# Patient Record
Sex: FEMALE | Race: WHITE | NOT HISPANIC OR LATINO | Employment: OTHER | URBAN - NONMETROPOLITAN AREA
[De-identification: names, ages, dates, MRNs, and addresses within clinical notes are randomized per-mention and may not be internally consistent; named-entity substitution may affect disease eponyms.]

---

## 2020-11-28 ENCOUNTER — HOSPITAL ENCOUNTER (EMERGENCY)
Facility: HOSPITAL | Age: 50
Discharge: HOME | End: 2020-11-28
Attending: EMERGENCY MEDICINE
Payer: MEDICAID

## 2020-11-28 VITALS
OXYGEN SATURATION: 97 % | HEART RATE: 103 BPM | HEIGHT: 61 IN | WEIGHT: 110 LBS | SYSTOLIC BLOOD PRESSURE: 176 MMHG | RESPIRATION RATE: 20 BRPM | TEMPERATURE: 99 F | DIASTOLIC BLOOD PRESSURE: 98 MMHG | BODY MASS INDEX: 20.77 KG/M2

## 2020-11-28 DIAGNOSIS — B35.6 FUNGAL INFECTION OF THE GROIN: Primary | ICD-10-CM

## 2020-11-28 PROCEDURE — 63700000 PHARM REV CODE 250 ALT 637 W/O HCPCS: Performed by: NURSE PRACTITIONER

## 2020-11-28 PROCEDURE — 99284 EMERGENCY DEPT VISIT MOD MDM: CPT

## 2020-11-28 RX ORDER — FLUCONAZOLE 100 MG/1
200 TABLET ORAL
Status: COMPLETED | OUTPATIENT
Start: 2020-11-28 | End: 2020-11-28

## 2020-11-28 RX ORDER — FLUCONAZOLE 200 MG/1
200 TABLET ORAL DAILY
Qty: 7 TABLET | Refills: 0 | Status: SHIPPED | OUTPATIENT
Start: 2020-11-28 | End: 2020-12-05

## 2020-11-28 RX ORDER — CLOTRIMAZOLE AND BETAMETHASONE DIPROPIONATE 10; .64 MG/G; MG/G
CREAM TOPICAL 2 TIMES DAILY
Qty: 1 TUBE | Refills: 1 | Status: SHIPPED | OUTPATIENT
Start: 2020-11-28

## 2020-11-28 RX ADMIN — FLUCONAZOLE 200 MG: 100 TABLET ORAL at 10:11

## 2020-11-28 NOTE — ED PROVIDER NOTES
Encounter Date: 11/28/2020       History     Chief Complaint   Patient presents with    Rash     I have a rash between my legs, it doesn't itch but it is very painful, been there 2 days.     Patient is a 50-year-old female presents with complaints of rash to the groin.  Onset of symptoms was yesterday morning.  She states that the rash burns but does not itch.  She reports using a prescription ointment prescribed by her primary care physician which is irritating it more than relieving symptoms.  She also reports no relief with over-the-counter medications.        Review of patient's allergies indicates:   Allergen Reactions    Sulfa (sulfonamide antibiotics) Rash     No past medical history on file.  No past surgical history on file.  No family history on file.  Social History     Tobacco Use    Smoking status: Not on file   Substance Use Topics    Alcohol use: Not on file    Drug use: Not on file     Review of Systems   Constitutional: Negative for fever.   HENT: Negative for sore throat.    Respiratory: Negative for shortness of breath.    Cardiovascular: Negative for chest pain.   Gastrointestinal: Negative for nausea.   Genitourinary: Negative for dysuria.   Musculoskeletal: Negative for back pain.   Skin: Positive for rash (Groin).   Neurological: Negative for weakness.   Hematological: Does not bruise/bleed easily.       Physical Exam     Initial Vitals [11/28/20 1033]   BP Pulse Resp Temp SpO2   (!) 176/98 103 20 98.6 °F (37 °C) 97 %      MAP       --         Physical Exam    Nursing note and vitals reviewed.  Constitutional: Kisha Javier appears well-developed and well-nourished.   HENT:   Head: Normocephalic and atraumatic.   Eyes: Conjunctivae and EOM are normal. Pupils are equal, round, and reactive to light.   Neck: Normal range of motion. Neck supple.   Cardiovascular: Normal rate, regular rhythm, normal heart sounds and intact distal pulses.   Pulmonary/Chest: Breath sounds normal.   Abdominal:  Soft. Bowel sounds are normal.   Musculoskeletal: Normal range of motion.   Neurological: Kisha Javier is alert and oriented to person, place, and time. Kisha Javier has normal strength and normal reflexes.   Skin: Skin is warm and dry. Capillary refill takes less than 2 seconds. Rash (Consistent with a fungal rash noted to the groin bilaterally) noted.   Psychiatric: Kisha Javier has a normal mood and affect. Kisha Javier's behavior is normal. Judgment and thought content normal.         ED Course   Procedures  Labs Reviewed - No data to display       Imaging Results    None          Medical Decision Making:   ED Management:  Patient instructed take medication as prescribed and follow-up with primary care physician on Monday or Tuesday of next week.                             Clinical Impression:       ICD-10-CM ICD-9-CM   1. Fungal infection of the groin  B35.6 110.3                      Disposition:   Disposition: Discharged     ED Disposition Condition    Discharge Stable        ED Prescriptions     Medication Sig Dispense Start Date End Date Auth. Provider    clotrimazole-betamethasone 1-0.05% (LOTRISONE) cream Apply topically 2 (two) times daily. 1 Tube 11/28/2020  Manuel Banda NP    fluconazole (DIFLUCAN) 200 MG Tab Take 1 tablet (200 mg total) by mouth once daily. for 7 days 7 tablet 11/28/2020 12/5/2020 Manuel Banda NP        Follow-up Information    None                                      Manuel Banda NP  11/28/20 8765

## 2021-11-28 ENCOUNTER — HOSPITAL ENCOUNTER (EMERGENCY)
Facility: HOSPITAL | Age: 51
Discharge: HOME | End: 2021-11-28
Attending: EMERGENCY MEDICINE
Payer: MEDICAID

## 2021-11-28 VITALS
DIASTOLIC BLOOD PRESSURE: 72 MMHG | TEMPERATURE: 99 F | WEIGHT: 100 LBS | BODY MASS INDEX: 18.88 KG/M2 | OXYGEN SATURATION: 98 % | SYSTOLIC BLOOD PRESSURE: 111 MMHG | HEIGHT: 61 IN | RESPIRATION RATE: 16 BRPM | HEART RATE: 84 BPM

## 2021-11-28 DIAGNOSIS — K04.7 DENTAL ABSCESS: Primary | ICD-10-CM

## 2021-11-28 PROCEDURE — 25000003 PHARM REV CODE 250: Performed by: NURSE PRACTITIONER

## 2021-11-28 PROCEDURE — 99284 EMERGENCY DEPT VISIT MOD MDM: CPT | Mod: 25

## 2021-11-28 RX ORDER — PENICILLIN V POTASSIUM 250 MG/1
500 TABLET, FILM COATED ORAL
Status: COMPLETED | OUTPATIENT
Start: 2021-11-28 | End: 2021-11-28

## 2021-11-28 RX ORDER — PENICILLIN V POTASSIUM 500 MG/1
500 TABLET, FILM COATED ORAL EVERY 6 HOURS
Qty: 28 TABLET | Refills: 0 | Status: SHIPPED | OUTPATIENT
Start: 2021-11-28

## 2021-11-28 RX ORDER — IBUPROFEN 800 MG/1
800 TABLET ORAL
Status: COMPLETED | OUTPATIENT
Start: 2021-11-28 | End: 2021-11-28

## 2021-11-28 RX ORDER — IBUPROFEN 800 MG/1
800 TABLET ORAL EVERY 6 HOURS PRN
Qty: 20 TABLET | Refills: 0 | Status: ON HOLD | OUTPATIENT
Start: 2021-11-28 | End: 2021-12-28 | Stop reason: HOSPADM

## 2021-11-28 RX ADMIN — IBUPROFEN 800 MG: 800 TABLET ORAL at 12:11

## 2021-11-28 RX ADMIN — PENICILLIN V POTASIUM 500 MG: 250 TABLET ORAL at 12:11

## 2021-12-24 ENCOUNTER — HOSPITAL ENCOUNTER (INPATIENT)
Facility: HOSPITAL | Age: 51
LOS: 4 days | Discharge: HOME | DRG: 085 | End: 2021-12-28
Attending: EMERGENCY MEDICINE | Admitting: INTERNAL MEDICINE
Payer: MEDICAID

## 2021-12-24 ENCOUNTER — HOSPITAL ENCOUNTER (EMERGENCY)
Facility: HOSPITAL | Age: 51
Discharge: ANOTHER HOSPITAL | End: 2021-12-24
Attending: STUDENT IN AN ORGANIZED HEALTH CARE EDUCATION/TRAINING PROGRAM
Payer: MEDICAID

## 2021-12-24 VITALS
TEMPERATURE: 98 F | HEART RATE: 84 BPM | RESPIRATION RATE: 18 BRPM | OXYGEN SATURATION: 97 % | DIASTOLIC BLOOD PRESSURE: 60 MMHG | SYSTOLIC BLOOD PRESSURE: 113 MMHG
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
## 2021-12-24 DIAGNOSIS — S06.5XAA SDH (SUBDURAL HEMATOMA): ICD-10-CM

## 2021-12-24 DIAGNOSIS — S06.5XAA SUBDURAL HEMATOMA: Primary | ICD-10-CM

## 2021-12-24 DIAGNOSIS — G93.40 ACUTE ENCEPHALOPATHY: ICD-10-CM

## 2021-12-24 DIAGNOSIS — S06.5XAA SDH (SUBDURAL HEMATOMA): Primary | ICD-10-CM

## 2021-12-24 DIAGNOSIS — R41.0 CONFUSED: ICD-10-CM

## 2021-12-24 DIAGNOSIS — U07.1 COVID: ICD-10-CM

## 2021-12-24 PROBLEM — R53.81 DEBILITY: Status: ACTIVE | Noted: 2021-12-24

## 2021-12-24 PROBLEM — F19.91 HISTORY OF DRUG USE: Status: ACTIVE | Noted: 2021-12-24

## 2021-12-24 PROBLEM — G93.5 BRAIN COMPRESSION: Status: ACTIVE | Noted: 2021-12-24

## 2021-12-24 LAB
ALBUMIN SERPL BCP-MCNC: 3.1 G/DL (ref 3.5–5.2)
ALP SERPL-CCNC: 109 U/L (ref 55–135)
ALT SERPL W/O P-5'-P-CCNC: 35 U/L (ref 10–44)
AMPHET+METHAMPHET UR QL: NEGATIVE
AMPHET+METHAMPHET UR QL: NEGATIVE
ANION GAP SERPL CALC-SCNC: 11 MMOL/L (ref 8–16)
APTT BLDCRRT: 32.5 SEC (ref 21–32)
AST SERPL-CCNC: 42 U/L (ref 10–40)
BARBITURATES UR QL SCN>200 NG/ML: NEGATIVE
BARBITURATES UR QL SCN>200 NG/ML: NEGATIVE
BASOPHILS # BLD AUTO: 0.02 K/UL (ref 0–0.2)
BASOPHILS NFR BLD: 0.2 % (ref 0–1.9)
BENZODIAZ UR QL SCN>200 NG/ML: NEGATIVE
BENZODIAZ UR QL SCN>200 NG/ML: NEGATIVE
BILIRUB SERPL-MCNC: 0.2 MG/DL (ref 0.1–1)
BUN SERPL-MCNC: 11 MG/DL (ref 6–20)
BUN SERPL-MCNC: 11 MG/DL (ref 6–30)
BZE UR QL SCN: NEGATIVE
BZE UR QL SCN: NEGATIVE
CALCIUM SERPL-MCNC: 8.6 MG/DL (ref 8.7–10.5)
CANNABINOIDS UR QL SCN: ABNORMAL
CANNABINOIDS UR QL SCN: ABNORMAL
CHLORIDE SERPL-SCNC: 105 MMOL/L (ref 95–110)
CHLORIDE SERPL-SCNC: 109 MMOL/L (ref 95–110)
CHOLEST SERPL-MCNC: 170 MG/DL (ref 120–199)
CHOLEST/HDLC SERPL: 2.5 {RATIO} (ref 2–5)
CK SERPL-CCNC: 200 U/L (ref 20–180)
CO2 SERPL-SCNC: 29 MMOL/L (ref 23–29)
CREAT SERPL-MCNC: 0.5 MG/DL (ref 0.5–1.4)
CREAT SERPL-MCNC: 0.8 MG/DL (ref 0.5–1.4)
CREAT UR-MCNC: 138 MG/DL (ref 15–325)
CREAT UR-MCNC: 138 MG/DL (ref 15–325)
CTP QC/QA: YES
DIFFERENTIAL METHOD: ABNORMAL
EOSINOPHIL # BLD AUTO: 0 K/UL (ref 0–0.5)
EOSINOPHIL NFR BLD: 0 % (ref 0–8)
ERYTHROCYTE [DISTWIDTH] IN BLOOD BY AUTOMATED COUNT: 14.1 % (ref 11.5–14.5)
EST. GFR  (AFRICAN AMERICAN): >60 ML/MIN/1.73 M^2
EST. GFR  (NON AFRICAN AMERICAN): >60 ML/MIN/1.73 M^2
ETHANOL SERPL-MCNC: <3 MG/DL
ETHANOL UR-MCNC: <10 MG/DL
GLUCOSE SERPL-MCNC: 108 MG/DL (ref 70–110)
GLUCOSE SERPL-MCNC: 124 MG/DL (ref 70–110)
HCT VFR BLD AUTO: 41.8 % (ref 37–48.5)
HCT VFR BLD CALC: 44 %PCV (ref 36–54)
HDLC SERPL-MCNC: 68 MG/DL (ref 40–75)
HDLC SERPL: 40 % (ref 20–50)
HGB BLD-MCNC: 13.9 G/DL (ref 12–16)
IMM GRANULOCYTES # BLD AUTO: 0.06 K/UL (ref 0–0.04)
IMM GRANULOCYTES NFR BLD AUTO: 0.5 % (ref 0–0.5)
INR PPP: 0.9 (ref 0.8–1.2)
LDLC SERPL CALC-MCNC: 79.2 MG/DL (ref 63–159)
LYMPHOCYTES # BLD AUTO: 1.7 K/UL (ref 1–4.8)
LYMPHOCYTES NFR BLD: 14.2 % (ref 18–48)
MAGNESIUM SERPL-MCNC: 2 MG/DL (ref 1.6–2.6)
MCH RBC QN AUTO: 30.3 PG (ref 27–31)
MCHC RBC AUTO-ENTMCNC: 33.3 G/DL (ref 32–36)
MCV RBC AUTO: 91 FL (ref 82–98)
METHADONE UR QL SCN>300 NG/ML: NEGATIVE
METHADONE UR QL SCN>300 NG/ML: NEGATIVE
MONOCYTES # BLD AUTO: 0.5 K/UL (ref 0.3–1)
MONOCYTES NFR BLD: 4.6 % (ref 4–15)
NEUTROPHILS # BLD AUTO: 9.4 K/UL (ref 1.8–7.7)
NEUTROPHILS NFR BLD: 80.5 % (ref 38–73)
NONHDLC SERPL-MCNC: 102 MG/DL
NRBC BLD-RTO: 0 /100 WBC
OPIATES UR QL SCN: NEGATIVE
OPIATES UR QL SCN: NEGATIVE
PCP UR QL SCN>25 NG/ML: NEGATIVE
PCP UR QL SCN>25 NG/ML: NEGATIVE
PHOSPHATE SERPL-MCNC: 2.8 MG/DL (ref 2.7–4.5)
PLATELET # BLD AUTO: 265 K/UL (ref 150–450)
PMV BLD AUTO: 8.5 FL (ref 9.2–12.9)
POC IONIZED CALCIUM: 1.01 MMOL/L (ref 1.06–1.42)
POC TCO2 (MEASURED): 23 MMOL/L (ref 23–29)
POTASSIUM BLD-SCNC: 3.4 MMOL/L (ref 3.5–5.1)
POTASSIUM SERPL-SCNC: 3.9 MMOL/L (ref 3.5–5.1)
PROT SERPL-MCNC: 7.3 G/DL (ref 6–8.4)
PROTHROMBIN TIME: 9.8 SEC (ref 9–12.5)
RBC # BLD AUTO: 4.58 M/UL (ref 4–5.4)
SAMPLE: ABNORMAL
SARS-COV-2 RDRP RESP QL NAA+PROBE: POSITIVE
SODIUM BLD-SCNC: 141 MMOL/L (ref 136–145)
SODIUM SERPL-SCNC: 145 MMOL/L (ref 136–145)
TOXICOLOGY INFORMATION: ABNORMAL
TOXICOLOGY INFORMATION: ABNORMAL
TRIGL SERPL-MCNC: 114 MG/DL (ref 30–150)
TROPONIN I SERPL DL<=0.01 NG/ML-MCNC: 0.01 NG/ML (ref 0–0.03)
TROPONIN I SERPL DL<=0.01 NG/ML-MCNC: 6.1 PG/ML (ref 0–60)
TSH SERPL DL<=0.005 MIU/L-ACNC: 1.49 UIU/ML (ref 0.4–4)
WBC # BLD AUTO: 11.7 K/UL (ref 3.9–12.7)

## 2021-12-24 PROCEDURE — 20000000 HC ICU ROOM

## 2021-12-24 PROCEDURE — 83735 ASSAY OF MAGNESIUM: CPT | Performed by: STUDENT IN AN ORGANIZED HEALTH CARE EDUCATION/TRAINING PROGRAM

## 2021-12-24 PROCEDURE — 27000207 HC ISOLATION

## 2021-12-24 PROCEDURE — 99291 CRITICAL CARE FIRST HOUR: CPT | Mod: 25

## 2021-12-24 PROCEDURE — 84100 ASSAY OF PHOSPHORUS: CPT | Performed by: STUDENT IN AN ORGANIZED HEALTH CARE EDUCATION/TRAINING PROGRAM

## 2021-12-24 PROCEDURE — 80047 BASIC METABLC PNL IONIZED CA: CPT

## 2021-12-24 PROCEDURE — 99285 EMERGENCY DEPT VISIT HI MDM: CPT | Mod: 25

## 2021-12-24 PROCEDURE — 25000003 PHARM REV CODE 250: Performed by: NURSE PRACTITIONER

## 2021-12-24 PROCEDURE — 99900035 HC TECH TIME PER 15 MIN (STAT)

## 2021-12-24 PROCEDURE — 84484 ASSAY OF TROPONIN QUANT: CPT | Mod: 91 | Performed by: INTERNAL MEDICINE

## 2021-12-24 PROCEDURE — 36415 COLL VENOUS BLD VENIPUNCTURE: CPT | Performed by: STUDENT IN AN ORGANIZED HEALTH CARE EDUCATION/TRAINING PROGRAM

## 2021-12-24 PROCEDURE — 63600175 PHARM REV CODE 636 W HCPCS: Performed by: STUDENT IN AN ORGANIZED HEALTH CARE EDUCATION/TRAINING PROGRAM

## 2021-12-24 PROCEDURE — 80061 LIPID PANEL: CPT | Performed by: INTERNAL MEDICINE

## 2021-12-24 PROCEDURE — 82077 ASSAY SPEC XCP UR&BREATH IA: CPT | Performed by: STUDENT IN AN ORGANIZED HEALTH CARE EDUCATION/TRAINING PROGRAM

## 2021-12-24 PROCEDURE — 94761 N-INVAS EAR/PLS OXIMETRY MLT: CPT

## 2021-12-24 PROCEDURE — 80053 COMPREHEN METABOLIC PANEL: CPT | Performed by: STUDENT IN AN ORGANIZED HEALTH CARE EDUCATION/TRAINING PROGRAM

## 2021-12-24 PROCEDURE — 85025 COMPLETE CBC W/AUTO DIFF WBC: CPT | Performed by: STUDENT IN AN ORGANIZED HEALTH CARE EDUCATION/TRAINING PROGRAM

## 2021-12-24 PROCEDURE — 96374 THER/PROPH/DIAG INJ IV PUSH: CPT

## 2021-12-24 PROCEDURE — 85730 THROMBOPLASTIN TIME PARTIAL: CPT | Performed by: INTERNAL MEDICINE

## 2021-12-24 PROCEDURE — 27000221 HC OXYGEN, UP TO 24 HOURS

## 2021-12-24 PROCEDURE — 84443 ASSAY THYROID STIM HORMONE: CPT | Performed by: INTERNAL MEDICINE

## 2021-12-24 PROCEDURE — 99284 EMERGENCY DEPT VISIT MOD MDM: CPT | Mod: ,,, | Performed by: EMERGENCY MEDICINE

## 2021-12-24 PROCEDURE — 99284 PR EMERGENCY DEPT VISIT,LEVEL IV: ICD-10-PCS | Mod: ,,, | Performed by: EMERGENCY MEDICINE

## 2021-12-24 PROCEDURE — 84484 ASSAY OF TROPONIN QUANT: CPT | Performed by: STUDENT IN AN ORGANIZED HEALTH CARE EDUCATION/TRAINING PROGRAM

## 2021-12-24 PROCEDURE — 99284 EMERGENCY DEPT VISIT MOD MDM: CPT | Mod: ,,, | Performed by: PHYSICIAN ASSISTANT

## 2021-12-24 PROCEDURE — 85610 PROTHROMBIN TIME: CPT | Performed by: INTERNAL MEDICINE

## 2021-12-24 PROCEDURE — 80307 DRUG TEST PRSMV CHEM ANLYZR: CPT | Performed by: NURSE PRACTITIONER

## 2021-12-24 PROCEDURE — 99284 PR EMERGENCY DEPT VISIT,LEVEL IV: ICD-10-PCS | Mod: ,,, | Performed by: PHYSICIAN ASSISTANT

## 2021-12-24 PROCEDURE — 25000003 PHARM REV CODE 250: Performed by: STUDENT IN AN ORGANIZED HEALTH CARE EDUCATION/TRAINING PROGRAM

## 2021-12-24 PROCEDURE — U0002 COVID-19 LAB TEST NON-CDC: HCPCS | Performed by: STUDENT IN AN ORGANIZED HEALTH CARE EDUCATION/TRAINING PROGRAM

## 2021-12-24 PROCEDURE — 82550 ASSAY OF CK (CPK): CPT | Performed by: STUDENT IN AN ORGANIZED HEALTH CARE EDUCATION/TRAINING PROGRAM

## 2021-12-24 PROCEDURE — 63600175 PHARM REV CODE 636 W HCPCS: Performed by: PHYSICIAN ASSISTANT

## 2021-12-24 RX ORDER — LEVETIRACETAM 500 MG/5ML
500 INJECTION, SOLUTION, CONCENTRATE INTRAVENOUS EVERY 12 HOURS
Status: DISCONTINUED | OUTPATIENT
Start: 2021-12-24 | End: 2021-12-24

## 2021-12-24 RX ORDER — DULOXETIN HYDROCHLORIDE 60 MG/1
60 CAPSULE, DELAYED RELEASE ORAL DAILY
COMMUNITY
Start: 2021-12-20

## 2021-12-24 RX ORDER — OXCARBAZEPINE 600 MG/1
TABLET, FILM COATED ORAL
Status: ON HOLD | COMMUNITY
End: 2021-12-28 | Stop reason: HOSPADM

## 2021-12-24 RX ORDER — LEVETIRACETAM 500 MG/5ML
INJECTION, SOLUTION, CONCENTRATE INTRAVENOUS
Status: DISPENSED
Start: 2021-12-24 | End: 2021-12-25

## 2021-12-24 RX ORDER — MONTELUKAST SODIUM 10 MG/1
TABLET ORAL
Status: ON HOLD | COMMUNITY
End: 2021-12-28 | Stop reason: HOSPADM

## 2021-12-24 RX ORDER — CYCLOBENZAPRINE HCL 5 MG
TABLET ORAL
Status: ON HOLD | COMMUNITY
End: 2021-12-28 | Stop reason: HOSPADM

## 2021-12-24 RX ORDER — GABAPENTIN 100 MG/1
CAPSULE ORAL
COMMUNITY
Start: 2021-12-20

## 2021-12-24 RX ORDER — MIRTAZAPINE 45 MG/1
TABLET, FILM COATED ORAL
Status: ON HOLD | COMMUNITY
End: 2021-12-28 | Stop reason: HOSPADM

## 2021-12-24 RX ORDER — MONTELUKAST SODIUM 10 MG/1
10 TABLET ORAL NIGHTLY
Status: DISCONTINUED | OUTPATIENT
Start: 2021-12-24 | End: 2021-12-28 | Stop reason: HOSPADM

## 2021-12-24 RX ORDER — DULOXETIN HYDROCHLORIDE 60 MG/1
CAPSULE, DELAYED RELEASE ORAL
Status: ON HOLD | COMMUNITY
End: 2021-12-28 | Stop reason: HOSPADM

## 2021-12-24 RX ORDER — PHENOL/SODIUM PHENOLATE
AEROSOL, SPRAY (ML) MUCOUS MEMBRANE
COMMUNITY

## 2021-12-24 RX ORDER — CYCLOBENZAPRINE HCL 5 MG
5 TABLET ORAL 3 TIMES DAILY PRN
COMMUNITY
Start: 2021-11-03 | End: 2023-01-07

## 2021-12-24 RX ORDER — LEVETIRACETAM 500 MG/1
500 TABLET ORAL 2 TIMES DAILY
Status: DISCONTINUED | OUTPATIENT
Start: 2021-12-24 | End: 2021-12-24

## 2021-12-24 RX ORDER — BUSPIRONE HYDROCHLORIDE 15 MG/1
15 TABLET ORAL 2 TIMES DAILY
COMMUNITY
Start: 2021-12-20

## 2021-12-24 RX ORDER — AMOXICILLIN 250 MG
1 CAPSULE ORAL DAILY
Status: DISCONTINUED | OUTPATIENT
Start: 2021-12-25 | End: 2021-12-28 | Stop reason: HOSPADM

## 2021-12-24 RX ORDER — ONDANSETRON 2 MG/ML
4 INJECTION INTRAMUSCULAR; INTRAVENOUS EVERY 6 HOURS PRN
Status: DISCONTINUED | OUTPATIENT
Start: 2021-12-24 | End: 2021-12-28 | Stop reason: HOSPADM

## 2021-12-24 RX ORDER — HYDRALAZINE HYDROCHLORIDE 20 MG/ML
10 INJECTION INTRAMUSCULAR; INTRAVENOUS EVERY 4 HOURS PRN
Status: DISCONTINUED | OUTPATIENT
Start: 2021-12-24 | End: 2021-12-28 | Stop reason: HOSPADM

## 2021-12-24 RX ORDER — ACETAMINOPHEN 325 MG/1
650 TABLET ORAL EVERY 6 HOURS PRN
Status: DISCONTINUED | OUTPATIENT
Start: 2021-12-24 | End: 2021-12-28 | Stop reason: HOSPADM

## 2021-12-24 RX ORDER — ACETAMINOPHEN 325 MG/1
650 TABLET ORAL
Status: COMPLETED | OUTPATIENT
Start: 2021-12-24 | End: 2021-12-24

## 2021-12-24 RX ORDER — QUETIAPINE FUMARATE 200 MG/1
TABLET, FILM COATED ORAL
Status: ON HOLD | COMMUNITY
End: 2021-12-28 | Stop reason: HOSPADM

## 2021-12-24 RX ORDER — GABAPENTIN 100 MG/1
CAPSULE ORAL
Status: ON HOLD | COMMUNITY
End: 2021-12-28 | Stop reason: HOSPADM

## 2021-12-24 RX ORDER — OXCARBAZEPINE 600 MG/1
600 TABLET, FILM COATED ORAL NIGHTLY
COMMUNITY
Start: 2021-12-20

## 2021-12-24 RX ORDER — ONDANSETRON 2 MG/ML
4 INJECTION INTRAMUSCULAR; INTRAVENOUS
Status: COMPLETED | OUTPATIENT
Start: 2021-12-24 | End: 2021-12-24

## 2021-12-24 RX ORDER — QUETIAPINE FUMARATE 200 MG/1
200 TABLET, FILM COATED ORAL NIGHTLY
COMMUNITY
Start: 2021-12-20

## 2021-12-24 RX ORDER — ESTROGENS, CONJUGATED 1.25 MG
1.25 TABLET ORAL DAILY
COMMUNITY
Start: 2021-12-20

## 2021-12-24 RX ORDER — MORPHINE SULFATE 2 MG/ML
2 INJECTION, SOLUTION INTRAMUSCULAR; INTRAVENOUS
Status: DISCONTINUED | OUTPATIENT
Start: 2021-12-24 | End: 2021-12-24 | Stop reason: HOSPADM

## 2021-12-24 RX ORDER — LEVETIRACETAM 500 MG/5ML
500 INJECTION, SOLUTION, CONCENTRATE INTRAVENOUS EVERY 12 HOURS
Status: DISCONTINUED | OUTPATIENT
Start: 2021-12-24 | End: 2021-12-25

## 2021-12-24 RX ORDER — MIRTAZAPINE 45 MG/1
45 TABLET, FILM COATED ORAL NIGHTLY
COMMUNITY
Start: 2021-12-20

## 2021-12-24 RX ORDER — MONTELUKAST SODIUM 10 MG/1
10 TABLET ORAL EVERY MORNING
COMMUNITY
Start: 2021-12-20

## 2021-12-24 RX ADMIN — ONDANSETRON HYDROCHLORIDE 4 MG: 2 SOLUTION INTRAMUSCULAR; INTRAVENOUS at 01:12

## 2021-12-24 RX ADMIN — LEVETIRACETAM 500 MG: 500 INJECTION, SOLUTION INTRAVENOUS at 03:12

## 2021-12-24 RX ADMIN — ACETAMINOPHEN 650 MG: 325 TABLET ORAL at 11:12

## 2021-12-24 RX ADMIN — MONTELUKAST 10 MG: 10 TABLET, FILM COATED ORAL at 09:12

## 2021-12-24 RX ADMIN — ACETAMINOPHEN 650 MG: 325 TABLET ORAL at 09:12

## 2021-12-24 NOTE — Clinical Note
Shay Ladd accompanied their mother to the emergency department on 12/24/2021. They may return to work on 12/27/2021.      If you have any questions or concerns, please don't hesitate to call.      Eileen Sams RN

## 2021-12-24 NOTE — ED PROVIDER NOTES
"Encounter Date: 12/24/2021       History     Chief Complaint   Patient presents with    Weakness     Patient fell lastnight per son. Patient has been "twitching" since lastnight. Ems states patient would not answer questions. Patient states she needs a blanket and water and that she was allergic to meication when I stated she was not. Patient states, "im coming too now and I am allergic to penicillin"     51-year-old female with history of methamphetamine abuse not on any blood thinner brought in by EMS for confusion.  According to family, patient was found on the ground with bruising to the left side of the face.  Family said that patient has been acting abnormal for the past week and is concerned that she may be using drugs.  Patient says that she was walking and fell but history limited due to medical condition        Review of patient's allergies indicates:   Allergen Reactions    Sulfa (sulfonamide antibiotics) Rash    Pcn [penicillins]      Past Medical History:   Diagnosis Date    Bipolar depression      History reviewed. No pertinent surgical history.  History reviewed. No pertinent family history.     Review of Systems   Unable to perform ROS: Mental status change       Physical Exam     Initial Vitals [12/24/21 1104]   BP Pulse Resp Temp SpO2   (!) 160/70 84 18 98 °F (36.7 °C) 100 %      MAP       --         Physical Exam    Nursing note and vitals reviewed.  Constitutional: Vital signs are normal. She appears well-developed and well-nourished.   HENT:   Head: Normocephalic and atraumatic.   Eyes: Conjunctivae, EOM and lids are normal. Pupils are equal, round, and reactive to light.   Neck: Trachea normal. Neck supple.   Cardiovascular: Normal rate, regular rhythm and normal pulses.   Pulmonary/Chest: Breath sounds normal. She has no wheezes. She has no rhonchi.   Abdominal: Abdomen is soft. Bowel sounds are normal.   Musculoskeletal:         General: Normal range of motion.      Cervical back: Neck " supple.     Neurological: She is alert and oriented to person, place, and time. She has normal strength.   GCS 14   Skin: Capillary refill takes less than 2 seconds.   Swelling to the left orbit and contusion to elbows any   Psychiatric: She has a normal mood and affect. Her speech is normal.         ED Course   Critical Care    Date/Time: 12/24/2021 2:00 PM  Performed by: Brayan Villa MD  Authorized by: Brayan Villa MD   Direct patient critical care time: 10 minutes  Additional history critical care time: 5 minutes  Ordering / reviewing critical care time: 5 minutes  Documentation critical care time: 5 minutes  Consulting other physicians critical care time: 5 minutes  Consult with family critical care time: 5 minutes  Total critical care time (exclusive of procedural time) : 35 minutes  Critical care time was exclusive of separately billable procedures and treating other patients.  Critical care was necessary to treat or prevent imminent or life-threatening deterioration of the following conditions: CNS failure or compromise and trauma.  Critical care was time spent personally by me on the following activities: evaluation of patient's response to treatment, examination of patient, obtaining history from patient or surrogate, ordering and performing treatments and interventions, ordering and review of radiographic studies, ordering and review of laboratory studies, pulse oximetry and re-evaluation of patient's condition.        Labs Reviewed   CBC W/ AUTO DIFFERENTIAL - Abnormal; Notable for the following components:       Result Value    MPV 8.5 (*)     Gran # (ANC) 9.4 (*)     Immature Grans (Abs) 0.06 (*)     Gran % 80.5 (*)     Lymph % 14.2 (*)     All other components within normal limits   COMPREHENSIVE METABOLIC PANEL - Abnormal; Notable for the following components:    Glucose 124 (*)     Calcium 8.6 (*)     Albumin 3.1 (*)     AST 42 (*)     All other components within normal limits   CK - Abnormal;  Notable for the following components:     (*)     All other components within normal limits   SARS-COV-2 RDRP GENE - Abnormal; Notable for the following components:    POC Rapid COVID Positive (*)     All other components within normal limits    Narrative:     This test utilizes isothermal nucleic acid amplification   technology to detect the SARS-CoV-2 RdRp nucleic acid segment.   The analytical sensitivity (limit of detection) is 125 genome   equivalents/mL.   A POSITIVE result implies infection with the SARS-CoV-2 virus;   the patient is presumed to be contagious.     A NEGATIVE result means that SARS-CoV-2 nucleic acids are not   present above the limit of detection. A NEGATIVE result should be   treated as presumptive. It does not rule out the possibility of   COVID-19 and should not be the sole basis for treatment decisions.   If COVID-19 is strongly suspected based on clinical and exposure   history, re-testing using an alternate molecular assay should be   considered.   This test is only for use under the Food and Drug   Administration s Emergency Use Authorization (EUA).   Commercial kits are provided by [facility].   Performance characteristics of the EUA have been independently   verified by [facility] Department of   Pathology and Laboratory Medicine.   _________________________________________________________________   The authorized Fact Sheet for Healthcare Providers and the authorized Fact   Sheet for Patients of the ID NOW COVID-19 are available on the FDA   website:     https://www.fda.gov/media/283403/download  https://www.fda.gov/media/470567/download         TROPONIN I HIGH SENSITIVITY   ALCOHOL,MEDICAL (ETHANOL)   MAGNESIUM   PHOSPHORUS     EKG Readings: (Independently Interpreted)   Initial Reading: No STEMI. Rhythm: Normal Sinus Rhythm. Conduction: Normal. Axis: Normal.       Imaging Results          CT Cervical Spine Without Contrast (Final result)  Result time  12/24/21 12:07:47    Final result by Odalys Yap MD (12/24/21 12:07:47)                 Impression:      No evidence of a fracture or dislocation    Multilevel spondylosis, greatest C5-6 and C6-7      Electronically signed by: Odalys Yap MD  Date:    12/24/2021  Time:    12:07             Narrative:    EXAMINATION:  CT CERVICAL SPINE WITHOUT CONTRAST    CLINICAL HISTORY:  Cervical radiculopathy, no red flags;    CT/Cardiac Nuclear exams in prior 12 months: 0    TECHNIQUE:  CT cervical spine without IV contrast.  Iterative reconstruction was utilized.  Sagittal and coronal reformats prepared.    FINDINGS:  There is no evidence of a cervical spine fracture or dislocation.    There is multilevel facet osteoarthritis, along with multilevel degenerative disc changes, greatest C5-6 and C6-7 with posterior disc osteophyte complexes.                               CT Maxillofacial Without Contrast (Final result)  Result time 12/24/21 11:59:58    Final result by Odalys Yap MD (12/24/21 11:59:58)                 Impression:      No evidence of a facial bone fracture    Prominent preseptal left periorbital soft tissue swelling      Electronically signed by: Odalys Yap MD  Date:    12/24/2021  Time:    11:59             Narrative:    EXAMINATION:  CT MAXILLOFACIAL WITHOUT CONTRAST    CLINICAL HISTORY:  Abnormal xray - maxface/sinus;    TECHNIQUE:  Axial images through the facial bones without IV contrast.  Iterative reconstruction utilized.  Sagittal and coronal reformats prepared.    FINDINGS:  Prominent left preseptal periorbital soft tissue swelling.  No evidence of a retrobulbar hematoma.  No evidence of a facial bone fracture.  Temporal mandibular joints are intact.  No sinus air-fluid levels.  Extensive dental caries.  Mastoids and middle ear cavities are clear.                               CT Head Without Contrast (Final result)  Result time 12/24/21 11:57:06    Final result by Odalys Yap,  MD (12/24/21 11:57:06)                 Impression:      1. Acute right subdural hematoma measuring less than 1 cm in thickness overlying portions of right frontal parietal and temporal lobes  2. Flattened right lateral ventricle with 5 mm left midline shift  3. Large left frontal-parietal scalp hematoma      Electronically signed by: Odalys Yap MD  Date:    12/24/2021  Time:    11:57             Narrative:    EXAMINATION:  CT HEAD WITHOUT CONTRAST    CLINICAL HISTORY:  Memory loss;    CT/Cardiac Nuclear exams in prior 12 months: 0    TECHNIQUE:  Axial head CT performed without IV contrast.  Iterative reconstruction utilized.    FINDINGS:  Acute subdural hemorrhage overlies portions of right frontal, parietal and temporal lobes measuring 7 mm in greatest thickness.  5 mm left midline shift.  Flattening of right lateral ventricle.  No acute infarct evident.  No atrophy.  Large left frontal parietal    Scalp hematoma.  No evidence of an underlying fracture.                               X-Ray Chest 1 View (Final result)  Result time 12/24/21 12:09:48    Final result by Odalys Yap MD (12/24/21 12:09:48)                 Impression:      No acute findings      Electronically signed by: Odalys Yap MD  Date:    12/24/2021  Time:    12:09             Narrative:    EXAMINATION:  XR CHEST 1 VIEW    CLINICAL HISTORY:  Disorientation, unspecified    FINDINGS:  No acute infiltrates. No pneumothorax or pleural effusion detected.  No vascular congestion. Normal size cardiac silhouette. Chronic appearing lung changes, likely COPD                                 Medications   acetaminophen tablet 650 mg (650 mg Oral Given 12/24/21 1154)   ondansetron injection 4 mg (4 mg Intravenous Given 12/24/21 1351)     Medical Decision Making:   Initial Assessment:   51-year-old female with history of methamphetamine abuse not on any blood thinner brought in by EMS for confusion.  Afebrile vitals stable.  GCS 14.  Moving all  extremities.  No signs of extraocular entrapment.  No hyphema.  Will get labs and imaging to rule out causes of delirium, traumatic injury.  Re-evaluate  Clinical Tests:   Lab Tests: Ordered and Reviewed  The following lab test(s) were unremarkable: CBC, CMP, Troponin, BNP, Urinalysis and UPT  Radiological Study: Ordered and Reviewed  Medical Tests: Ordered and Reviewed             ED Course as of 12/25/21 0639   Fri Dec 24, 2021   1215 Patient has subdural hematoma.  Patient neurologically the same GCS of 14. Will transfer patient to a facility with Neurosurgery  [HD]      ED Course User Index  [HD] Brayan Villa MD             Clinical Impression:   Final diagnoses:  [R41.0] Confused  [W19.XXXA] Fall  [S06.5X9A] SDH (subdural hematoma) (Primary)  [G93.40] Acute encephalopathy  [U07.1] COVID          ED Disposition Condition    Transfer to Another Facility Stable              Brayan Villa MD  12/25/21 0639

## 2021-12-24 NOTE — Clinical Note
Shay Ladd accompanied their child to the emergency department on 12/24/2021. They may return to work on 12/27/2021.      If you have any questions or concerns, please don't hesitate to call.      Eileen Sams RN

## 2021-12-25 LAB
ALBUMIN SERPL BCP-MCNC: 2.6 G/DL (ref 3.5–5.2)
ALP SERPL-CCNC: 96 U/L (ref 55–135)
ALT SERPL W/O P-5'-P-CCNC: 31 U/L (ref 10–44)
ANION GAP SERPL CALC-SCNC: 9 MMOL/L (ref 8–16)
ANISOCYTOSIS BLD QL SMEAR: SLIGHT
AST SERPL-CCNC: 47 U/L (ref 10–40)
BASOPHILS # BLD AUTO: 0.03 K/UL (ref 0–0.2)
BASOPHILS NFR BLD: 0.4 % (ref 0–1.9)
BILIRUB SERPL-MCNC: 0.2 MG/DL (ref 0.1–1)
BUN SERPL-MCNC: 13 MG/DL (ref 6–20)
CALCIUM SERPL-MCNC: 8.4 MG/DL (ref 8.7–10.5)
CHLORIDE SERPL-SCNC: 105 MMOL/L (ref 95–110)
CO2 SERPL-SCNC: 23 MMOL/L (ref 23–29)
CREAT SERPL-MCNC: 0.6 MG/DL (ref 0.5–1.4)
DIFFERENTIAL METHOD: ABNORMAL
EOSINOPHIL # BLD AUTO: 0 K/UL (ref 0–0.5)
EOSINOPHIL NFR BLD: 0 % (ref 0–8)
ERYTHROCYTE [DISTWIDTH] IN BLOOD BY AUTOMATED COUNT: 14.6 % (ref 11.5–14.5)
EST. GFR  (AFRICAN AMERICAN): >60 ML/MIN/1.73 M^2
EST. GFR  (NON AFRICAN AMERICAN): >60 ML/MIN/1.73 M^2
ESTIMATED AVG GLUCOSE: 94 MG/DL (ref 68–131)
GLUCOSE SERPL-MCNC: 85 MG/DL (ref 70–110)
HBA1C MFR BLD: 4.9 % (ref 4–5.6)
HCT VFR BLD AUTO: 38.8 % (ref 37–48.5)
HGB BLD-MCNC: 12.4 G/DL (ref 12–16)
IMM GRANULOCYTES # BLD AUTO: 0.02 K/UL (ref 0–0.04)
IMM GRANULOCYTES NFR BLD AUTO: 0.3 % (ref 0–0.5)
LYMPHOCYTES # BLD AUTO: 2.7 K/UL (ref 1–4.8)
LYMPHOCYTES NFR BLD: 37.4 % (ref 18–48)
MAGNESIUM SERPL-MCNC: 2 MG/DL (ref 1.6–2.6)
MCH RBC QN AUTO: 30.2 PG (ref 27–31)
MCHC RBC AUTO-ENTMCNC: 32 G/DL (ref 32–36)
MCV RBC AUTO: 94 FL (ref 82–98)
MONOCYTES # BLD AUTO: 0.4 K/UL (ref 0.3–1)
MONOCYTES NFR BLD: 6.1 % (ref 4–15)
NEUTROPHILS # BLD AUTO: 4 K/UL (ref 1.8–7.7)
NEUTROPHILS NFR BLD: 55.8 % (ref 38–73)
NRBC BLD-RTO: 0 /100 WBC
PHOSPHATE SERPL-MCNC: 2 MG/DL (ref 2.7–4.5)
PLATELET # BLD AUTO: 248 K/UL (ref 150–450)
PLATELET BLD QL SMEAR: ABNORMAL
PMV BLD AUTO: 9.5 FL (ref 9.2–12.9)
POTASSIUM SERPL-SCNC: 3.8 MMOL/L (ref 3.5–5.1)
PROT SERPL-MCNC: 6.3 G/DL (ref 6–8.4)
RBC # BLD AUTO: 4.11 M/UL (ref 4–5.4)
SODIUM SERPL-SCNC: 137 MMOL/L (ref 136–145)
WBC # BLD AUTO: 7.17 K/UL (ref 3.9–12.7)

## 2021-12-25 PROCEDURE — 80053 COMPREHEN METABOLIC PANEL: CPT | Performed by: INTERNAL MEDICINE

## 2021-12-25 PROCEDURE — 83735 ASSAY OF MAGNESIUM: CPT | Performed by: INTERNAL MEDICINE

## 2021-12-25 PROCEDURE — 25000003 PHARM REV CODE 250: Performed by: NURSE PRACTITIONER

## 2021-12-25 PROCEDURE — 83036 HEMOGLOBIN GLYCOSYLATED A1C: CPT | Performed by: EMERGENCY MEDICINE

## 2021-12-25 PROCEDURE — 27000207 HC ISOLATION

## 2021-12-25 PROCEDURE — 84100 ASSAY OF PHOSPHORUS: CPT | Performed by: INTERNAL MEDICINE

## 2021-12-25 PROCEDURE — 99291 PR CRITICAL CARE, E/M 30-74 MINUTES: ICD-10-PCS | Mod: ,,, | Performed by: PSYCHIATRY & NEUROLOGY

## 2021-12-25 PROCEDURE — 27000221 HC OXYGEN, UP TO 24 HOURS

## 2021-12-25 PROCEDURE — 63600175 PHARM REV CODE 636 W HCPCS: Performed by: PHYSICIAN ASSISTANT

## 2021-12-25 PROCEDURE — 94761 N-INVAS EAR/PLS OXIMETRY MLT: CPT

## 2021-12-25 PROCEDURE — 20000000 HC ICU ROOM

## 2021-12-25 PROCEDURE — 99291 CRITICAL CARE FIRST HOUR: CPT | Mod: ,,, | Performed by: PSYCHIATRY & NEUROLOGY

## 2021-12-25 PROCEDURE — 85025 COMPLETE CBC W/AUTO DIFF WBC: CPT | Performed by: NURSE PRACTITIONER

## 2021-12-25 PROCEDURE — 25000003 PHARM REV CODE 250: Performed by: PSYCHIATRY & NEUROLOGY

## 2021-12-25 RX ORDER — QUETIAPINE FUMARATE 200 MG/1
200 TABLET, FILM COATED ORAL NIGHTLY
Status: DISCONTINUED | OUTPATIENT
Start: 2021-12-25 | End: 2021-12-28 | Stop reason: HOSPADM

## 2021-12-25 RX ORDER — SODIUM,POTASSIUM PHOSPHATES 280-250MG
2 POWDER IN PACKET (EA) ORAL
Status: DISCONTINUED | OUTPATIENT
Start: 2021-12-25 | End: 2021-12-27

## 2021-12-25 RX ORDER — LANOLIN ALCOHOL/MO/W.PET/CERES
800 CREAM (GRAM) TOPICAL
Status: DISCONTINUED | OUTPATIENT
Start: 2021-12-25 | End: 2021-12-27

## 2021-12-25 RX ORDER — DULOXETIN HYDROCHLORIDE 30 MG/1
60 CAPSULE, DELAYED RELEASE ORAL DAILY
Status: DISCONTINUED | OUTPATIENT
Start: 2021-12-25 | End: 2021-12-28 | Stop reason: HOSPADM

## 2021-12-25 RX ORDER — LEVETIRACETAM 500 MG/1
500 TABLET ORAL 2 TIMES DAILY
Status: DISCONTINUED | OUTPATIENT
Start: 2021-12-25 | End: 2021-12-28 | Stop reason: HOSPADM

## 2021-12-25 RX ADMIN — ACETAMINOPHEN 650 MG: 325 TABLET ORAL at 08:12

## 2021-12-25 RX ADMIN — QUETIAPINE FUMARATE 200 MG: 200 TABLET ORAL at 08:12

## 2021-12-25 RX ADMIN — LEVETIRACETAM 500 MG: 500 INJECTION, SOLUTION INTRAVENOUS at 08:12

## 2021-12-25 RX ADMIN — MONTELUKAST 10 MG: 10 TABLET, FILM COATED ORAL at 08:12

## 2021-12-25 RX ADMIN — DULOXETINE 60 MG: 30 CAPSULE, DELAYED RELEASE ORAL at 04:12

## 2021-12-25 RX ADMIN — LEVETIRACETAM 500 MG: 500 TABLET, FILM COATED ORAL at 08:12

## 2021-12-25 RX ADMIN — SENNOSIDES AND DOCUSATE SODIUM 1 TABLET: 50; 8.6 TABLET ORAL at 04:12

## 2021-12-26 LAB
ALBUMIN SERPL BCP-MCNC: 2.9 G/DL (ref 3.5–5.2)
ALP SERPL-CCNC: 89 U/L (ref 55–135)
ALT SERPL W/O P-5'-P-CCNC: 41 U/L (ref 10–44)
ANION GAP SERPL CALC-SCNC: 12 MMOL/L (ref 8–16)
ANISOCYTOSIS BLD QL SMEAR: SLIGHT
AST SERPL-CCNC: 59 U/L (ref 10–40)
BASOPHILS # BLD AUTO: 0.02 K/UL (ref 0–0.2)
BASOPHILS NFR BLD: 0.3 % (ref 0–1.9)
BILIRUB SERPL-MCNC: 0.3 MG/DL (ref 0.1–1)
BUN SERPL-MCNC: 14 MG/DL (ref 6–20)
CALCIUM SERPL-MCNC: 8.8 MG/DL (ref 8.7–10.5)
CHLORIDE SERPL-SCNC: 104 MMOL/L (ref 95–110)
CO2 SERPL-SCNC: 23 MMOL/L (ref 23–29)
CREAT SERPL-MCNC: 0.6 MG/DL (ref 0.5–1.4)
DIFFERENTIAL METHOD: ABNORMAL
EOSINOPHIL # BLD AUTO: 0 K/UL (ref 0–0.5)
EOSINOPHIL NFR BLD: 0.6 % (ref 0–8)
ERYTHROCYTE [DISTWIDTH] IN BLOOD BY AUTOMATED COUNT: 13.9 % (ref 11.5–14.5)
EST. GFR  (AFRICAN AMERICAN): >60 ML/MIN/1.73 M^2
EST. GFR  (NON AFRICAN AMERICAN): >60 ML/MIN/1.73 M^2
GLUCOSE SERPL-MCNC: 73 MG/DL (ref 70–110)
HCT VFR BLD AUTO: 40 % (ref 37–48.5)
HGB BLD-MCNC: 13.2 G/DL (ref 12–16)
IMM GRANULOCYTES # BLD AUTO: 0.04 K/UL (ref 0–0.04)
IMM GRANULOCYTES NFR BLD AUTO: 0.6 % (ref 0–0.5)
LYMPHOCYTES # BLD AUTO: 3.5 K/UL (ref 1–4.8)
LYMPHOCYTES NFR BLD: 53.4 % (ref 18–48)
MAGNESIUM SERPL-MCNC: 2 MG/DL (ref 1.6–2.6)
MCH RBC QN AUTO: 30.5 PG (ref 27–31)
MCHC RBC AUTO-ENTMCNC: 33 G/DL (ref 32–36)
MCV RBC AUTO: 92 FL (ref 82–98)
MONOCYTES # BLD AUTO: 0.4 K/UL (ref 0.3–1)
MONOCYTES NFR BLD: 6.7 % (ref 4–15)
NEUTROPHILS # BLD AUTO: 2.5 K/UL (ref 1.8–7.7)
NEUTROPHILS NFR BLD: 38.4 % (ref 38–73)
NRBC BLD-RTO: 0 /100 WBC
PHOSPHATE SERPL-MCNC: 2.4 MG/DL (ref 2.7–4.5)
PLATELET # BLD AUTO: 267 K/UL (ref 150–450)
PLATELET BLD QL SMEAR: ABNORMAL
PMV BLD AUTO: 8.8 FL (ref 9.2–12.9)
POCT GLUCOSE: 106 MG/DL (ref 70–110)
POTASSIUM SERPL-SCNC: 3.6 MMOL/L (ref 3.5–5.1)
PROT SERPL-MCNC: 6.4 G/DL (ref 6–8.4)
RBC # BLD AUTO: 4.33 M/UL (ref 4–5.4)
SODIUM SERPL-SCNC: 139 MMOL/L (ref 136–145)
WBC # BLD AUTO: 6.61 K/UL (ref 3.9–12.7)

## 2021-12-26 PROCEDURE — 25000003 PHARM REV CODE 250: Performed by: NURSE PRACTITIONER

## 2021-12-26 PROCEDURE — 83735 ASSAY OF MAGNESIUM: CPT | Performed by: PSYCHIATRY & NEUROLOGY

## 2021-12-26 PROCEDURE — 85025 COMPLETE CBC W/AUTO DIFF WBC: CPT | Performed by: PSYCHIATRY & NEUROLOGY

## 2021-12-26 PROCEDURE — 63600175 PHARM REV CODE 636 W HCPCS: Performed by: NURSE PRACTITIONER

## 2021-12-26 PROCEDURE — 20000000 HC ICU ROOM

## 2021-12-26 PROCEDURE — 97165 OT EVAL LOW COMPLEX 30 MIN: CPT

## 2021-12-26 PROCEDURE — 25000003 PHARM REV CODE 250: Performed by: PSYCHIATRY & NEUROLOGY

## 2021-12-26 PROCEDURE — 99233 SBSQ HOSP IP/OBS HIGH 50: CPT | Mod: ,,, | Performed by: PSYCHIATRY & NEUROLOGY

## 2021-12-26 PROCEDURE — 99232 PR SUBSEQUENT HOSPITAL CARE,LEVL II: ICD-10-PCS | Mod: ,,, | Performed by: STUDENT IN AN ORGANIZED HEALTH CARE EDUCATION/TRAINING PROGRAM

## 2021-12-26 PROCEDURE — 97535 SELF CARE MNGMENT TRAINING: CPT

## 2021-12-26 PROCEDURE — 27000207 HC ISOLATION

## 2021-12-26 PROCEDURE — 80053 COMPREHEN METABOLIC PANEL: CPT | Performed by: PSYCHIATRY & NEUROLOGY

## 2021-12-26 PROCEDURE — 99233 PR SUBSEQUENT HOSPITAL CARE,LEVL III: ICD-10-PCS | Mod: ,,, | Performed by: PSYCHIATRY & NEUROLOGY

## 2021-12-26 PROCEDURE — 99232 SBSQ HOSP IP/OBS MODERATE 35: CPT | Mod: ,,, | Performed by: STUDENT IN AN ORGANIZED HEALTH CARE EDUCATION/TRAINING PROGRAM

## 2021-12-26 PROCEDURE — 97530 THERAPEUTIC ACTIVITIES: CPT

## 2021-12-26 PROCEDURE — 84100 ASSAY OF PHOSPHORUS: CPT | Performed by: PSYCHIATRY & NEUROLOGY

## 2021-12-26 PROCEDURE — 97161 PT EVAL LOW COMPLEX 20 MIN: CPT

## 2021-12-26 RX ORDER — GABAPENTIN 100 MG/1
100 CAPSULE ORAL 3 TIMES DAILY
Status: CANCELLED | OUTPATIENT
Start: 2021-12-26

## 2021-12-26 RX ORDER — OXCARBAZEPINE 600 MG/1
600 TABLET, FILM COATED ORAL DAILY
Status: CANCELLED | OUTPATIENT
Start: 2021-12-26

## 2021-12-26 RX ORDER — POTASSIUM CHLORIDE 750 MG/1
30 CAPSULE, EXTENDED RELEASE ORAL ONCE
Status: COMPLETED | OUTPATIENT
Start: 2021-12-26 | End: 2021-12-26

## 2021-12-26 RX ORDER — DULOXETIN HYDROCHLORIDE 30 MG/1
30 CAPSULE, DELAYED RELEASE ORAL DAILY
Status: CANCELLED | OUTPATIENT
Start: 2021-12-26

## 2021-12-26 RX ORDER — HEPARIN SODIUM 5000 [USP'U]/ML
5000 INJECTION, SOLUTION INTRAVENOUS; SUBCUTANEOUS EVERY 8 HOURS
Status: DISCONTINUED | OUTPATIENT
Start: 2021-12-26 | End: 2021-12-28 | Stop reason: HOSPADM

## 2021-12-26 RX ADMIN — QUETIAPINE FUMARATE 200 MG: 200 TABLET ORAL at 09:12

## 2021-12-26 RX ADMIN — MONTELUKAST 10 MG: 10 TABLET, FILM COATED ORAL at 09:12

## 2021-12-26 RX ADMIN — POTASSIUM CHLORIDE 30 MEQ: 10 CAPSULE, COATED, EXTENDED RELEASE ORAL at 11:12

## 2021-12-26 RX ADMIN — LEVETIRACETAM 500 MG: 500 TABLET, FILM COATED ORAL at 09:12

## 2021-12-26 RX ADMIN — DULOXETINE 60 MG: 30 CAPSULE, DELAYED RELEASE ORAL at 09:12

## 2021-12-26 RX ADMIN — HEPARIN SODIUM 5000 UNITS: 5000 INJECTION INTRAVENOUS; SUBCUTANEOUS at 09:12

## 2021-12-26 RX ADMIN — HEPARIN SODIUM 5000 UNITS: 5000 INJECTION INTRAVENOUS; SUBCUTANEOUS at 02:12

## 2021-12-27 PROCEDURE — 63600175 PHARM REV CODE 636 W HCPCS: Performed by: NURSE PRACTITIONER

## 2021-12-27 PROCEDURE — 99233 PR SUBSEQUENT HOSPITAL CARE,LEVL III: ICD-10-PCS | Mod: ,,, | Performed by: NURSE PRACTITIONER

## 2021-12-27 PROCEDURE — 11000001 HC ACUTE MED/SURG PRIVATE ROOM

## 2021-12-27 PROCEDURE — 25000003 PHARM REV CODE 250: Performed by: PHYSICIAN ASSISTANT

## 2021-12-27 PROCEDURE — 27000207 HC ISOLATION

## 2021-12-27 PROCEDURE — 25000003 PHARM REV CODE 250: Performed by: NURSE PRACTITIONER

## 2021-12-27 PROCEDURE — 25000003 PHARM REV CODE 250: Performed by: PSYCHIATRY & NEUROLOGY

## 2021-12-27 PROCEDURE — 97535 SELF CARE MNGMENT TRAINING: CPT

## 2021-12-27 PROCEDURE — 99233 SBSQ HOSP IP/OBS HIGH 50: CPT | Mod: ,,, | Performed by: NURSE PRACTITIONER

## 2021-12-27 RX ORDER — MIRTAZAPINE 15 MG/1
45 TABLET, ORALLY DISINTEGRATING ORAL NIGHTLY
Status: DISCONTINUED | OUTPATIENT
Start: 2021-12-27 | End: 2021-12-27

## 2021-12-27 RX ORDER — OXCARBAZEPINE 300 MG/1
600 TABLET, FILM COATED ORAL NIGHTLY
Status: DISCONTINUED | OUTPATIENT
Start: 2021-12-27 | End: 2021-12-28 | Stop reason: HOSPADM

## 2021-12-27 RX ORDER — OXCARBAZEPINE 300 MG/1
600 TABLET, FILM COATED ORAL NIGHTLY
Status: DISCONTINUED | OUTPATIENT
Start: 2021-12-27 | End: 2021-12-27

## 2021-12-27 RX ADMIN — SENNOSIDES AND DOCUSATE SODIUM 1 TABLET: 50; 8.6 TABLET ORAL at 08:12

## 2021-12-27 RX ADMIN — OXCARBAZEPINE 600 MG: 300 TABLET, FILM COATED ORAL at 09:12

## 2021-12-27 RX ADMIN — LEVETIRACETAM 500 MG: 500 TABLET, FILM COATED ORAL at 09:12

## 2021-12-27 RX ADMIN — HEPARIN SODIUM 5000 UNITS: 5000 INJECTION INTRAVENOUS; SUBCUTANEOUS at 06:12

## 2021-12-27 RX ADMIN — LEVETIRACETAM 500 MG: 500 TABLET, FILM COATED ORAL at 08:12

## 2021-12-27 RX ADMIN — ACETAMINOPHEN 650 MG: 325 TABLET ORAL at 03:12

## 2021-12-27 RX ADMIN — QUETIAPINE FUMARATE 200 MG: 200 TABLET ORAL at 09:12

## 2021-12-27 RX ADMIN — HEPARIN SODIUM 5000 UNITS: 5000 INJECTION INTRAVENOUS; SUBCUTANEOUS at 12:12

## 2021-12-27 RX ADMIN — DULOXETINE 60 MG: 30 CAPSULE, DELAYED RELEASE ORAL at 08:12

## 2021-12-27 RX ADMIN — HEPARIN SODIUM 5000 UNITS: 5000 INJECTION INTRAVENOUS; SUBCUTANEOUS at 09:12

## 2021-12-27 RX ADMIN — MIRTAZAPINE 45 MG: 30 TABLET, FILM COATED ORAL at 09:12

## 2021-12-27 RX ADMIN — MONTELUKAST 10 MG: 10 TABLET, FILM COATED ORAL at 09:12

## 2021-12-28 VITALS
WEIGHT: 94.81 LBS | HEIGHT: 61 IN | BODY MASS INDEX: 17.9 KG/M2 | SYSTOLIC BLOOD PRESSURE: 153 MMHG | DIASTOLIC BLOOD PRESSURE: 67 MMHG | TEMPERATURE: 100 F | HEART RATE: 74 BPM | RESPIRATION RATE: 16 BRPM | OXYGEN SATURATION: 96 %

## 2021-12-28 PROCEDURE — 99233 PR SUBSEQUENT HOSPITAL CARE,LEVL III: ICD-10-PCS | Mod: ,,,

## 2021-12-28 PROCEDURE — 63600175 PHARM REV CODE 636 W HCPCS: Performed by: NURSE PRACTITIONER

## 2021-12-28 PROCEDURE — 99233 SBSQ HOSP IP/OBS HIGH 50: CPT | Mod: ,,,

## 2021-12-28 PROCEDURE — 25000003 PHARM REV CODE 250: Performed by: PSYCHIATRY & NEUROLOGY

## 2021-12-28 PROCEDURE — 25000003 PHARM REV CODE 250: Performed by: NURSE PRACTITIONER

## 2021-12-28 PROCEDURE — 97535 SELF CARE MNGMENT TRAINING: CPT

## 2021-12-28 RX ORDER — LEVETIRACETAM 500 MG/1
500 TABLET ORAL 2 TIMES DAILY
Qty: 60 TABLET | Refills: 11 | Status: CANCELLED | OUTPATIENT
Start: 2021-12-28 | End: 2022-12-28

## 2021-12-28 RX ORDER — LEVETIRACETAM 500 MG/1
500 TABLET ORAL 2 TIMES DAILY
Qty: 8 TABLET | Refills: 0 | Status: SHIPPED | OUTPATIENT
Start: 2021-12-28 | End: 2022-01-01

## 2021-12-28 RX ADMIN — DULOXETINE 60 MG: 30 CAPSULE, DELAYED RELEASE ORAL at 09:12

## 2021-12-28 RX ADMIN — LEVETIRACETAM 500 MG: 500 TABLET, FILM COATED ORAL at 09:12

## 2021-12-28 RX ADMIN — HEPARIN SODIUM 5000 UNITS: 5000 INJECTION INTRAVENOUS; SUBCUTANEOUS at 06:12

## 2021-12-28 RX ADMIN — HEPARIN SODIUM 5000 UNITS: 5000 INJECTION INTRAVENOUS; SUBCUTANEOUS at 02:12

## 2021-12-28 RX ADMIN — SENNOSIDES AND DOCUSATE SODIUM 1 TABLET: 50; 8.6 TABLET ORAL at 09:12

## 2021-12-29 ENCOUNTER — TELEPHONE (OUTPATIENT)
Dept: NEUROSURGERY | Facility: CLINIC | Age: 51
End: 2021-12-29
Payer: MEDICAID

## 2022-02-24 ENCOUNTER — HOSPITAL ENCOUNTER (OUTPATIENT)
Dept: RADIOLOGY | Facility: HOSPITAL | Age: 52
Discharge: HOME | End: 2022-02-24
Payer: MEDICAID

## 2022-02-24 DIAGNOSIS — S06.5XAA SUBDURAL HEMATOMA: ICD-10-CM

## 2022-02-24 PROCEDURE — 70450 CT HEAD WITHOUT CONTRAST: ICD-10-PCS | Mod: 26,,, | Performed by: RADIOLOGY

## 2022-02-24 PROCEDURE — 70450 CT HEAD/BRAIN W/O DYE: CPT | Mod: TC

## 2022-02-24 PROCEDURE — 70450 CT HEAD/BRAIN W/O DYE: CPT | Mod: 26,,, | Performed by: RADIOLOGY

## 2022-04-02 ENCOUNTER — HOSPITAL ENCOUNTER (EMERGENCY)
Facility: HOSPITAL | Age: 52
Discharge: HOME | End: 2022-04-02
Attending: STUDENT IN AN ORGANIZED HEALTH CARE EDUCATION/TRAINING PROGRAM
Payer: MEDICAID

## 2022-04-02 VITALS
RESPIRATION RATE: 20 BRPM | HEIGHT: 61 IN | SYSTOLIC BLOOD PRESSURE: 183 MMHG | HEART RATE: 105 BPM | TEMPERATURE: 98 F | BODY MASS INDEX: 17.75 KG/M2 | WEIGHT: 94 LBS | OXYGEN SATURATION: 100 % | DIASTOLIC BLOOD PRESSURE: 76 MMHG

## 2022-04-02 DIAGNOSIS — M25.559 HIP PAIN: Primary | ICD-10-CM

## 2022-04-02 PROCEDURE — 96372 THER/PROPH/DIAG INJ SC/IM: CPT | Performed by: CLINICAL NURSE SPECIALIST

## 2022-04-02 PROCEDURE — 63600175 PHARM REV CODE 636 W HCPCS: Performed by: CLINICAL NURSE SPECIALIST

## 2022-04-02 PROCEDURE — 25000003 PHARM REV CODE 250: Performed by: CLINICAL NURSE SPECIALIST

## 2022-04-02 PROCEDURE — 99284 EMERGENCY DEPT VISIT MOD MDM: CPT | Mod: 25

## 2022-04-02 RX ORDER — KETOROLAC TROMETHAMINE 30 MG/ML
30 INJECTION, SOLUTION INTRAMUSCULAR; INTRAVENOUS
Status: COMPLETED | OUTPATIENT
Start: 2022-04-02 | End: 2022-04-02

## 2022-04-02 RX ORDER — CYCLOBENZAPRINE HCL 10 MG
10 TABLET ORAL
Status: COMPLETED | OUTPATIENT
Start: 2022-04-02 | End: 2022-04-02

## 2022-04-02 RX ORDER — METHOCARBAMOL 500 MG/1
500 TABLET, FILM COATED ORAL 4 TIMES DAILY
Qty: 20 TABLET | Refills: 0 | Status: SHIPPED | OUTPATIENT
Start: 2022-04-02 | End: 2022-04-07

## 2022-04-02 RX ORDER — KETOROLAC TROMETHAMINE 10 MG/1
10 TABLET, FILM COATED ORAL EVERY 6 HOURS
Qty: 20 TABLET | Refills: 0 | Status: SHIPPED | OUTPATIENT
Start: 2022-04-02 | End: 2022-04-07

## 2022-04-02 RX ORDER — DEXAMETHASONE SODIUM PHOSPHATE 4 MG/ML
4 INJECTION, SOLUTION INTRA-ARTICULAR; INTRALESIONAL; INTRAMUSCULAR; INTRAVENOUS; SOFT TISSUE
Status: COMPLETED | OUTPATIENT
Start: 2022-04-02 | End: 2022-04-02

## 2022-04-02 RX ADMIN — CYCLOBENZAPRINE HYDROCHLORIDE 10 MG: 10 TABLET, FILM COATED ORAL at 08:04

## 2022-04-02 RX ADMIN — KETOROLAC TROMETHAMINE 30 MG: 30 INJECTION, SOLUTION INTRAMUSCULAR at 08:04

## 2022-04-02 RX ADMIN — DEXAMETHASONE SODIUM PHOSPHATE 4 MG: 4 INJECTION, SOLUTION INTRA-ARTICULAR; INTRALESIONAL; INTRAMUSCULAR; INTRAVENOUS; SOFT TISSUE at 08:04

## 2022-04-03 NOTE — ED PROVIDER NOTES
"Encounter Date: 4/2/2022       History     Chief Complaint   Patient presents with    Hip Pain     Pt c/o right hip pain onset around 1 hour ago. Pt describes pain as spasms, burning and cold. "Muscle is frogging around my hip." Pt states that she has been to PCP and has a "weak hip."     Kisha Ladd is an 51 y.o. female who complains of right hip pain which occurred approximately 1 hour ago.  Denies any injury, trauma, fall.        Review of patient's allergies indicates:   Allergen Reactions    Sulfa (sulfonamide antibiotics) Rash    Pcn [penicillins]      Past Medical History:   Diagnosis Date    Bipolar depression      History reviewed. No pertinent surgical history.  History reviewed. No pertinent family history.  Social History     Tobacco Use    Smoking status: Current Every Day Smoker     Packs/day: 1.00     Types: Cigarettes    Smokeless tobacco: Never Used     Review of Systems   Constitutional: Negative for fever.   HENT: Negative for sore throat.    Respiratory: Negative for shortness of breath.    Cardiovascular: Negative for chest pain.   Gastrointestinal: Negative for nausea.   Genitourinary: Negative for dysuria.   Musculoskeletal: Positive for gait problem. Negative for back pain.   Skin: Negative for rash.   Neurological: Negative for weakness.   Hematological: Does not bruise/bleed easily.   All other systems reviewed and are negative.      Physical Exam     Initial Vitals [04/02/22 2031]   BP Pulse Resp Temp SpO2   (!) 183/76 105 20 98.3 °F (36.8 °C) 100 %      MAP       --         Physical Exam    Nursing note and vitals reviewed.  Constitutional: She appears well-developed and well-nourished.   HENT:   Head: Normocephalic and atraumatic.   Eyes: Pupils are equal, round, and reactive to light.   Neck:   Normal range of motion.  Cardiovascular: Normal rate and regular rhythm.   Pulmonary/Chest: Breath sounds normal.   Abdominal: Abdomen is soft. Bowel sounds are normal. "   Musculoskeletal:         General: Normal range of motion.      Cervical back: Normal range of motion.     Neurological: She is alert and oriented to person, place, and time.   Skin: Skin is warm and dry.   Psychiatric: She has a normal mood and affect.         ED Course   Procedures  Labs Reviewed - No data to display       Imaging Results    None          Medications   ketorolac injection 30 mg (has no administration in time range)   cyclobenzaprine tablet 10 mg (has no administration in time range)   dexamethasone injection 4 mg (has no administration in time range)     Medical Decision Making:   Differential Diagnosis:   Right hip pain, sciatica, chronic pain syndrome                      Clinical Impression:   Final diagnoses:  [M25.559] Hip pain (Primary)          ED Disposition Condition    Discharge Stable        ED Prescriptions     Medication Sig Dispense Start Date End Date Auth. Provider    methocarbamoL (ROBAXIN) 500 MG Tab Take 1 tablet (500 mg total) by mouth 4 (four) times daily. for 5 days 20 tablet 4/2/2022 4/7/2022 Amberly Garcia NP    ketorolac (TORADOL) 10 mg tablet Take 1 tablet (10 mg total) by mouth every 6 (six) hours. for 5 days 20 tablet 4/2/2022 4/7/2022 Amberly Garcia NP        Follow-up Information     Follow up With Specialties Details Why Contact Info    Kenton Degroot MD Family Medicine  As needed [address] [facility] [address]  [phone]             Amberly Garcia NP  04/02/22 2037

## 2022-12-07 ENCOUNTER — HOSPITAL ENCOUNTER (EMERGENCY)
Facility: HOSPITAL | Age: 52
Discharge: HOME | End: 2022-12-07
Attending: EMERGENCY MEDICINE
Payer: MEDICAID

## 2022-12-07 VITALS
HEIGHT: 61 IN | OXYGEN SATURATION: 98 % | RESPIRATION RATE: 18 BRPM | WEIGHT: 99 LBS | TEMPERATURE: 99 F | HEART RATE: 77 BPM | SYSTOLIC BLOOD PRESSURE: 153 MMHG | DIASTOLIC BLOOD PRESSURE: 80 MMHG | BODY MASS INDEX: 18.69 KG/M2

## 2022-12-07 DIAGNOSIS — T14.90XA INJURY: ICD-10-CM

## 2022-12-07 DIAGNOSIS — S93.401A SPRAIN OF RIGHT ANKLE, UNSPECIFIED LIGAMENT, INITIAL ENCOUNTER: Primary | ICD-10-CM

## 2022-12-07 PROCEDURE — 99284 EMERGENCY DEPT VISIT MOD MDM: CPT

## 2022-12-07 PROCEDURE — 25000003 PHARM REV CODE 250: Performed by: NURSE PRACTITIONER

## 2022-12-07 RX ORDER — DICLOFENAC SODIUM 75 MG/1
75 TABLET, DELAYED RELEASE ORAL 2 TIMES DAILY
Qty: 10 TABLET | Refills: 0 | Status: SHIPPED | OUTPATIENT
Start: 2022-12-07 | End: 2022-12-12

## 2022-12-07 RX ORDER — HYDROCODONE BITARTRATE AND ACETAMINOPHEN 5; 325 MG/1; MG/1
1 TABLET ORAL EVERY 8 HOURS PRN
Qty: 6 TABLET | Refills: 0 | Status: SHIPPED | OUTPATIENT
Start: 2022-12-07 | End: 2022-12-09

## 2022-12-07 RX ORDER — HYDROCODONE BITARTRATE AND ACETAMINOPHEN 5; 325 MG/1; MG/1
1 TABLET ORAL
Status: COMPLETED | OUTPATIENT
Start: 2022-12-07 | End: 2022-12-07

## 2022-12-07 RX ADMIN — HYDROCODONE BITARTRATE AND ACETAMINOPHEN 1 TABLET: 5; 325 TABLET ORAL at 07:12

## 2022-12-08 NOTE — ED PROVIDER NOTES
"Encounter Date: 12/7/2022       History     Chief Complaint   Patient presents with    Joint Swelling     Pt stated that she was washing dishes couple hours prior to arrival, attempted to "crack her ankle" - felt pop and has since become swollen/painful.      This is a 51 y/o wf with noncontributory pmhx who presents to the ED with c/o right ankle pain after sustaining an injury jpta.  Patient reports that she rolled her right ankle, attempting to "crack it" and felt a "popping sensation" and developed immediate swelling and bruising.  She reports difficulty ambulating as well.  She denies previous ankle injury, numbness/tingling, or history of osteoporosis.    Review of patient's allergies indicates:   Allergen Reactions    Sulfa (sulfonamide antibiotics) Rash    Pcn [penicillins]      Past Medical History:   Diagnosis Date    Bipolar depression      No past surgical history on file.  No family history on file.  Social History     Tobacco Use    Smoking status: Every Day     Packs/day: 1.00     Types: Cigarettes    Smokeless tobacco: Never     Review of Systems   Musculoskeletal:  Positive for arthralgias, gait problem and joint swelling.   Skin:  Positive for color change.   Neurological:  Negative for numbness.     Physical Exam     Initial Vitals [12/07/22 1853]   BP Pulse Resp Temp SpO2   (!) 155/77 95 20 98.7 °F (37.1 °C) 98 %      MAP       --         Physical Exam    Nursing note and vitals reviewed.  Constitutional: She appears well-developed and well-nourished. She is active. No distress.   HENT:   Head: Normocephalic and atraumatic.   Eyes: EOM are normal. Pupils are equal, round, and reactive to light.   Neck: Neck supple.   Normal range of motion.  Cardiovascular:  Normal rate.           Pulmonary/Chest: No respiratory distress.   Musculoskeletal:         General: Tenderness and edema present.      Cervical back: Normal range of motion and neck supple.      Comments: The right lateral ankle appears " moderately swollen with ecchymosis.  Patient experiences tenderness to the anterior and posterior malleolus.  Limited range of motion noted secondary to pain.  Distally neurovascularly intact.     Neurological: She is alert and oriented to person, place, and time. GCS score is 15. GCS eye subscore is 4. GCS verbal subscore is 5. GCS motor subscore is 6.   Skin: Skin is warm and dry. Capillary refill takes less than 2 seconds.   Psychiatric: She has a normal mood and affect. Her behavior is normal. Thought content normal.       ED Course   Procedures  Labs Reviewed - No data to display       Imaging Results              X-Ray Ankle Complete Right (In process)                      Medications   HYDROcodone-acetaminophen 5-325 mg per tablet 1 tablet (1 tablet Oral Given 12/7/22 1913)                 ED Course as of 12/07/22 1959   Wed Dec 07, 2022   1949 No acute finding on right ankle xray [CB]      ED Course User Index  [CB] Cecile Maki NP                 Clinical Impression:   Final diagnoses:  [T14.90XA] Injury  [S93.401A] Sprain of right ankle, unspecified ligament, initial encounter (Primary)        ED Disposition Condition    Discharge Stable          ED Prescriptions       Medication Sig Dispense Start Date End Date Auth. Provider    diclofenac (VOLTAREN) 75 MG EC tablet Take 1 tablet (75 mg total) by mouth 2 (two) times daily. for 5 days 10 tablet 12/7/2022 12/12/2022 Cecile Maki NP    HYDROcodone-acetaminophen (NORCO) 5-325 mg per tablet Take 1 tablet by mouth every 8 (eight) hours as needed for Pain. 6 tablet 12/7/2022 12/9/2022 Cecile Maki NP          Follow-up Information       Follow up With Specialties Details Why Contact Info    González Field NP Orthopedic Surgery Schedule an appointment as soon as possible for a visit in 1 week As needed, If symptoms worsen [facility] [address] [address]  [phone]               Cecile Maki NP  12/07/22 1959

## 2022-12-08 NOTE — DISCHARGE INSTRUCTIONS
Maintain Ace wrap and crutches as needed.  Apply ice and elevate ankle to help reduce pain and swelling.  Take medications as directed.  Follow-up with ortho if your symptoms do not improve.  Return to the emergency room for worsening condition.

## 2022-12-09 ENCOUNTER — HOSPITAL ENCOUNTER (OUTPATIENT)
Dept: TELEMEDICINE | Facility: HOSPITAL | Age: 52
Discharge: HOME | End: 2022-12-09
Payer: MEDICAID

## 2022-12-09 DIAGNOSIS — I63.512 ACUTE ISCHEMIC LEFT MCA STROKE: ICD-10-CM

## 2022-12-09 PROCEDURE — 99215 PR OFFICE/OUTPT VISIT, EST, LEVL V, 40-54 MIN: ICD-10-PCS | Mod: 95,,, | Performed by: PSYCHIATRY & NEUROLOGY

## 2022-12-09 PROCEDURE — 99215 OFFICE O/P EST HI 40 MIN: CPT | Mod: 95,,, | Performed by: PSYCHIATRY & NEUROLOGY

## 2022-12-09 NOTE — ASSESSMENT & PLAN NOTE
51 y/o woman with history of traumatic SDH presenting with acute onset R sided weakness and dysarthria.  Recommend treating with IV tPA and transferring for further care.  No cortical signs such as aphasia to suggest LVO.    Antithrombotics for secondary stroke prevention: Antiplatelets: None: Hold all Antithrombotics x 24 hours after IV Thrombolytic therapy administration    Statins for secondary stroke prevention and hyperlipidemia, if present:   Statins: Atorvastatin- 40 mg daily    Aggressive risk factor modification: Smoking     Rehab efforts: The patient has been evaluated by a stroke team provider and the therapy needs have been fully considered based off the presenting complaints and exam findings. The following therapy evaluations are needed: PT evaluate and treat, OT evaluate and treat, SLP evaluate and treat    Diagnostics ordered/pending: CTA Head to assess vasculature , CTA Neck/Arch to assess vasculature, HgbA1C to assess blood glucose levels, Lipid Profile to assess cholesterol levels, MRI head without contrast to assess brain parenchyma, TTE to assess cardiac function/status , TSH to assess thyroid function    VTE prophylaxis: Mechanical prophylaxis: Place SCDs    BP parameters: Infarct: Post Thrombolytic therapy, SBP <180

## 2022-12-09 NOTE — CONSULTS
[facility]  Vascular Neurology  Comprehensive Stroke Center  TeleVascular Neurology Acute Consultation Note      Consults    Consulting Provider: SHIRA HERMAN  Current Providers  No providers found    Patient Location: [facility] Emergency Department  Spoke hospital nurse at bedside with patient assisting consultant.     Patient information was obtained from patient.         Assessment/Plan:       Diagnoses:   Acute ischemic left MCA stroke  53 y/o woman with history of traumatic SDH presenting with acute onset R sided weakness and dysarthria.  Recommend treating with IV tPA and transferring for further care.  No cortical signs such as aphasia to suggest LVO.    Antithrombotics for secondary stroke prevention: Antiplatelets: None: Hold all Antithrombotics x 24 hours after IV Thrombolytic therapy administration    Statins for secondary stroke prevention and hyperlipidemia, if present:   Statins: Atorvastatin- 40 mg daily    Aggressive risk factor modification: Smoking     Rehab efforts: The patient has been evaluated by a stroke team provider and the therapy needs have been fully considered based off the presenting complaints and exam findings. The following therapy evaluations are needed: PT evaluate and treat, OT evaluate and treat, SLP evaluate and treat    Diagnostics ordered/pending: CTA Head to assess vasculature , CTA Neck/Arch to assess vasculature, HgbA1C to assess blood glucose levels, Lipid Profile to assess cholesterol levels, MRI head without contrast to assess brain parenchyma, TTE to assess cardiac function/status , TSH to assess thyroid function    VTE prophylaxis: Mechanical prophylaxis: Place SCDs    BP parameters: Infarct: Post Thrombolytic therapy, SBP <180            STROKE DOCUMENTATION     Acute Stroke Times:   Acute Stroke Times   Last Known Normal Date: 12/09/22  Last Known Normal Time: 1000  Stroke  Team Called Date: 12/09/22  Stroke Team Called Time: 1131  Stroke Team Arrival Date: 12/09/22  Stroke Team Arrival Time: 1141  CT Interpretation Time: 1142  Thrombolytic Therapy Recommended: Yes  Thrombectomy Recommended: No  Decision to Treat Time for Tenecteplase: 1150    NIH Scale:  1a. Level of Consciousness: 0-->Alert, keenly responsive  1b. LOC Questions: 0-->Answers both questions correctly  1c. LOC Commands: 0-->Performs both tasks correctly  2. Best Gaze: 0-->Normal  3. Visual: 0-->No visual loss  4. Facial Palsy: 2-->Partial paralysis (total or near-total paralysis of lower face)  5a. Motor Arm, Left: 0-->No drift, limb holds 90 (or 45) degrees for full 10 secs  5b. Motor Arm, Right: 1-->Drift, limb holds 90 (or 45) degrees, but drifts down before full 10 secs, does not hit bed or other support  6a. Motor Leg, Left: 0-->No drift, leg holds 30 degree position for full 5 secs  6b. Motor Leg, Right: 2-->Some effort against gravity, leg falls to bed by 5 secs, but has some effort against gravity  7. Limb Ataxia: 0-->Absent  8. Sensory: 1-->Mild-to-moderate sensory loss, patient feels pinprick is less sharp or is dull on the affected side, or there is a loss of superficial pain with pinprick, but patient is aware of being touched  9. Best Language: 0-->No aphasia, normal  10. Dysarthria: 1-->Mild-to-moderate dysarthria, patient slurs at least some words and, at worst, can be understood with some difficulty  11. Extinction and Inattention (formerly Neglect): 0-->No abnormality  Total (NIH Stroke Scale): 7     Modified [place]    Heber Coma Scale:    ABCD2 Score:    [id number] Score:   HAS -BLED Score:   ICH Score:   Hunt & Tran Classification:       There were no vitals taken for this visit.  Eligible for thrombolytic therapy?: Yes  Thrombolytic therapy recomended: Recommend IV Tenecteplase 0.25mg/kg IV push (max dose 25mg); If Tenecteplase is not available use Alteplase 0.9mg/kg IV bolus followed by infusion  (max dose 90mg)     Additional Recommendations:   Neurological assessment and vital signs (except temperature) every 15 minutes x 2 hours, then every 30 minutes x 6 hours, then every hour x 16 hours..  Frequency of BP assessments may need to be increased if systolic BP stays >= 180 mm Hg or diastolic BP stays >= 105 mm Hg. Administer antihypertensive meds as ordered  Temperature every 4 hours or as required.  Follow hospital protocol for further orders re: post thrombolytic therapy patient management.  No antithrombotics or anticoagulants (including but not limited to: heparin, warfarin, aspirin, clopidigrel, or dipyridamole) for 24 hours, then start antithrombotics as ordered by treating physician    Adapted from the American Heart Association/American Stroke Association (AHA/ASA) and American Association of Neuroscience Nurses (AANN) Guidelines.   Possible Interventional Revascularization Candidate? No; at this time symptoms not suggestive of large vessel occlusion    Disposition Recommendation: transfer to nearest appropriate facility     Subjective:     History of Present Illness:  53 y/o woman with h/o traumatic SDH c/b seizures (Dec 2021), presenting with R sided weakness and slurred speech.  Symptoms started after she woke up this morning.  No recent bleeding or surgery.      Woke up with symptoms?: no    Recent bleeding noted: no  Does the patient take any Blood Thinners? no  Medications: No relevant medications      Past Medical History: seizures. traumatic SDH (2021)    Past Surgical History: none    Family History: no relevant history    Social History: smoker (active)    Allergies: Sulfa (Sulfonamide Antibiotics)  Pcn [Penicillins] No relevant allergies    Review of Systems   Neurological: Positive for speech difficulty and weakness.     Objective:   Vitals: There were no vitals taken for this visit. BP: 130s    CT READ: Yes  No hemmorhage. No mass effect. No early infarct signs.     Physical  Exam  Constitutional:       General: She is not in acute distress.  HENT:      Head: Normocephalic and atraumatic.   Eyes:      Extraocular Movements: EOM normal.      Pupils: Pupils are equal, round, and reactive to light.   Pulmonary:      Effort: Pulmonary effort is normal.   Neurological:      Comments: R hemiparesis  Slurred speech             Recommended the emergency room physician to have a brief discussion with the patient and/or family if available regarding the  risks and benefits of treatment, and to briefly document the occurrence of that discussion in his clinical encounter note.     The attending portion of this evaluation, treatment, and documentation was performed per Melodie Xiong MD via audiovisual.    Billing code:  (time dependent stroke, complex case, unstable patient, hemorrhages, any intervention, some mimics)    This patient has a very critical neurological condition/illness, with very high morbidity and mortality.  There is a very high probability for acute neurological change leading to clinical and possibly life-threatening deterioration requiring highest level of physician preparedness for urgent intervention.  There is possibility that this condition will require treatment with high risk medications as quickly as possible.  There is also a possibility that the patient may benefit from further, more advance complex therapies (e.g. endovascular therapy) that will require prompt diagnosis and care.  Care was coordinated with other physicians involved in the patient's care.  Radiologic studies and laboratory data were reviewed and interpreted, and plan of care was re-assessed based on the results.  Diagnosis, treatment options and prognosis may have been discussed with the patient and/or family members or caregiver.  Further advanced medical management and further evaluation is warranted for his care.    In your opinion, this was a: Tier 1 Van Negative    Consult End Time: 12:14 PM      Melodie Xiong MD  Comprehensive Stroke Center  Vascular Neurology   [facility]

## 2022-12-09 NOTE — HPI
51 y/o woman with h/o traumatic SDH c/b seizures (Dec 2021), presenting with R sided weakness and slurred speech.  Symptoms started after she woke up this morning.  No recent bleeding or surgery.

## 2022-12-09 NOTE — SUBJECTIVE & OBJECTIVE
Woke up with symptoms?: no    Recent bleeding noted: no  Does the patient take any Blood Thinners? no  Medications: No relevant medications      Past Medical History: seizures. traumatic SDH (2021)    Past Surgical History: none    Family History: no relevant history    Social History: smoker (active)    Allergies: Sulfa (Sulfonamide Antibiotics)  Pcn [Penicillins] No relevant allergies    Review of Systems   Neurological: Positive for speech difficulty and weakness.     Objective:   Vitals: There were no vitals taken for this visit. BP: 130s    CT READ: Yes  No hemmorhage. No mass effect. No early infarct signs.     Physical Exam  Constitutional:       General: She is not in acute distress.  HENT:      Head: Normocephalic and atraumatic.   Eyes:      Extraocular Movements: EOM normal.      Pupils: Pupils are equal, round, and reactive to light.   Pulmonary:      Effort: Pulmonary effort is normal.   Neurological:      Comments: R hemiparesis  Slurred speech

## 2022-12-15 DIAGNOSIS — S93.401A SPRAIN OF RIGHT ANKLE, UNSPECIFIED LIGAMENT, INITIAL ENCOUNTER: Primary | ICD-10-CM

## 2022-12-20 ENCOUNTER — OFFICE VISIT (OUTPATIENT)
Dept: ORTHOPEDICS | Facility: CLINIC | Age: 52
End: 2022-12-20
Payer: MEDICAID

## 2022-12-20 VITALS
SYSTOLIC BLOOD PRESSURE: 142 MMHG | HEIGHT: 61 IN | HEART RATE: 81 BPM | WEIGHT: 98 LBS | OXYGEN SATURATION: 95 % | BODY MASS INDEX: 18.5 KG/M2 | DIASTOLIC BLOOD PRESSURE: 84 MMHG

## 2022-12-20 DIAGNOSIS — S93.401A SPRAIN OF RIGHT ANKLE, UNSPECIFIED LIGAMENT, INITIAL ENCOUNTER: ICD-10-CM

## 2022-12-20 PROCEDURE — 3079F PR MOST RECENT DIASTOLIC BLOOD PRESSURE 80-89 MM HG: ICD-10-PCS | Mod: CPTII,,, | Performed by: NURSE PRACTITIONER

## 2022-12-20 PROCEDURE — 99999 PR PBB SHADOW E&M-EST. PATIENT-LVL IV: CPT | Mod: PBBFAC,,, | Performed by: NURSE PRACTITIONER

## 2022-12-20 PROCEDURE — 1159F MED LIST DOCD IN RCRD: CPT | Mod: CPTII,,, | Performed by: NURSE PRACTITIONER

## 2022-12-20 PROCEDURE — 3008F BODY MASS INDEX DOCD: CPT | Mod: CPTII,,, | Performed by: NURSE PRACTITIONER

## 2022-12-20 PROCEDURE — 99203 OFFICE O/P NEW LOW 30 MIN: CPT | Mod: S$PBB,,, | Performed by: NURSE PRACTITIONER

## 2022-12-20 PROCEDURE — 3077F SYST BP >= 140 MM HG: CPT | Mod: CPTII,,, | Performed by: NURSE PRACTITIONER

## 2022-12-20 PROCEDURE — 3077F PR MOST RECENT SYSTOLIC BLOOD PRESSURE >= 140 MM HG: ICD-10-PCS | Mod: CPTII,,, | Performed by: NURSE PRACTITIONER

## 2022-12-20 PROCEDURE — 3079F DIAST BP 80-89 MM HG: CPT | Mod: CPTII,,, | Performed by: NURSE PRACTITIONER

## 2022-12-20 PROCEDURE — 3008F PR BODY MASS INDEX (BMI) DOCUMENTED: ICD-10-PCS | Mod: CPTII,,, | Performed by: NURSE PRACTITIONER

## 2022-12-20 PROCEDURE — 1160F PR REVIEW ALL MEDS BY PRESCRIBER/CLIN PHARMACIST DOCUMENTED: ICD-10-PCS | Mod: CPTII,,, | Performed by: NURSE PRACTITIONER

## 2022-12-20 PROCEDURE — 99999 PR PBB SHADOW E&M-EST. PATIENT-LVL IV: ICD-10-PCS | Mod: PBBFAC,,, | Performed by: NURSE PRACTITIONER

## 2022-12-20 PROCEDURE — 99203 PR OFFICE/OUTPT VISIT, NEW, LEVL III, 30-44 MIN: ICD-10-PCS | Mod: S$PBB,,, | Performed by: NURSE PRACTITIONER

## 2022-12-20 PROCEDURE — 1160F RVW MEDS BY RX/DR IN RCRD: CPT | Mod: CPTII,,, | Performed by: NURSE PRACTITIONER

## 2022-12-20 PROCEDURE — 99214 OFFICE O/P EST MOD 30 MIN: CPT | Mod: PBBFAC | Performed by: NURSE PRACTITIONER

## 2022-12-20 PROCEDURE — 1159F PR MEDICATION LIST DOCUMENTED IN MEDICAL RECORD: ICD-10-PCS | Mod: CPTII,,, | Performed by: NURSE PRACTITIONER

## 2022-12-20 NOTE — PROGRESS NOTES
"Subjective:      Kisha Ladd is a 52 y.o. female who presents with right ankle pain. She is referred by [facility] ER. Date of injury was on 12/07/22. She states that she was washing dishes and her ankle popped and gave way. She had immediate pain, difficulty bearing weight and swelling over the lateral ankle. She went to the ER, radiographs did not show any acute fractures. She was diagnosed with a sprain and treated with pain meds and Nsaid. She is referred to orthopedics. She presents and rates her pain as 7/10 over the lateral ankle. Current symptoms include: ability to bear weight, but with some pain, bruising, stiffness, swelling, and worsening symptoms after a period of inactivity. Aggravating factors: direct pressure, standing, and weight bearing. Symptoms have gradually worsened. Patient has had no prior ankle problems. Evaluation to date: plain films: normal. She is noted with a limp and is using a walker.     The following portions of the patient's history were reviewed and updated as appropriate: allergies, current medications, past family history, past medical history, past social history, past surgical history, and problem list.      Objective:      BP (!) 142/84 (BP Location: Right arm, Patient Position: Sitting, BP Method: Medium (Manual))   Pulse 81   Ht 5' 1" (1.549 m)   Wt 44.5 kg (98 lb)   SpO2 95%   BMI 18.52 kg/m²     NVI  Right ankle:   ecchymosis noted lateral ankle  positive findings: decreased dorsiflexion, decreased plantar flexion, decreased range of motion, and tenderness over lateral malleolus, ATFL on the right.   No ankle instability.   No Achilles tenderness.   No med to distal foot tenderness.    Left ankle:   normal  no effusion, full range of motion, no tenderness.  no bruising noted   Brisk cap refill.     Imaging:  X-ray of the right ankle(s): done on 12/07/22 reviewed   demonstrate no fracture or dislocation.  There is no focal soft tissue abnormality.     Assessment: "     RT ankle sprain     Plan:     Natural history and expected course discussed. Questions answered.  Rest, ice, compression, elevation (RICE) therapy.  WBAT. She was placed in a walker boot for joint immobilization/support. Instructed on application and usage.   Continue walker as previous.   Tylenol prn.   RTC 2 weeks, repeat radiographs if needed.

## 2023-01-03 ENCOUNTER — OFFICE VISIT (OUTPATIENT)
Dept: ORTHOPEDICS | Facility: CLINIC | Age: 53
End: 2023-01-03
Payer: MEDICAID

## 2023-01-03 DIAGNOSIS — S93.401A SPRAIN OF RIGHT ANKLE, UNSPECIFIED LIGAMENT, INITIAL ENCOUNTER: Primary | ICD-10-CM

## 2023-01-03 PROCEDURE — 99999 PR PBB SHADOW E&M-EST. PATIENT-LVL III: CPT | Mod: PBBFAC,,, | Performed by: NURSE PRACTITIONER

## 2023-01-03 PROCEDURE — 1159F PR MEDICATION LIST DOCUMENTED IN MEDICAL RECORD: ICD-10-PCS | Mod: CPTII,,, | Performed by: NURSE PRACTITIONER

## 2023-01-03 PROCEDURE — 99213 PR OFFICE/OUTPT VISIT, EST, LEVL III, 20-29 MIN: ICD-10-PCS | Mod: S$PBB,,, | Performed by: NURSE PRACTITIONER

## 2023-01-03 PROCEDURE — 1159F MED LIST DOCD IN RCRD: CPT | Mod: CPTII,,, | Performed by: NURSE PRACTITIONER

## 2023-01-03 PROCEDURE — 99213 OFFICE O/P EST LOW 20 MIN: CPT | Mod: PBBFAC | Performed by: NURSE PRACTITIONER

## 2023-01-03 PROCEDURE — 99999 PR PBB SHADOW E&M-EST. PATIENT-LVL III: ICD-10-PCS | Mod: PBBFAC,,, | Performed by: NURSE PRACTITIONER

## 2023-01-03 PROCEDURE — 1160F RVW MEDS BY RX/DR IN RCRD: CPT | Mod: CPTII,,, | Performed by: NURSE PRACTITIONER

## 2023-01-03 PROCEDURE — 1160F PR REVIEW ALL MEDS BY PRESCRIBER/CLIN PHARMACIST DOCUMENTED: ICD-10-PCS | Mod: CPTII,,, | Performed by: NURSE PRACTITIONER

## 2023-01-03 PROCEDURE — 99213 OFFICE O/P EST LOW 20 MIN: CPT | Mod: S$PBB,,, | Performed by: NURSE PRACTITIONER

## 2023-01-03 NOTE — PROGRESS NOTES
"  Subjective:      Follow up: 3 week follow up RT ankle injury    Kisha Ladd is a 52 y.o. female who presents for follow up for right ankle pain.  Date of injury was on 12/07/22. She states that tenderness and swelling over the lateral ankle has decreased. She presents in her walker boot and and rates her pain as 3/10 over the lateral ankle. Current symptoms include: ability to bear weight, but with some pain and stiffness. Patient is noted with a minimal limp.     The following portions of the patient's history were reviewed and updated as appropriate: allergies, current medications, past family history, past medical history, past social history, past surgical history, and problem list.      Objective:       BP (!) 142/84 (BP Location: Right arm, Patient Position: Sitting, BP Method: Medium (Manual))   Pulse 81   Ht 5' 1" (1.549 m)   Wt 44.5 kg (98 lb)   SpO2 95%   BMI 18.52 kg/m²      NVI  Mild limp noted.   Right ankle:  Ecchymosis lateral ankle has resolved.  Improved range of motion, and decreasing tenderness over lateral malleolus, ATFL.  No ankle instability.   No Achilles tenderness.      Left ankle:   normal  no effusion, full range of motion, no tenderness.  no bruising noted   Brisk cap refill.      Imaging:  None today     Assessment:      RT ankle sprain- 3 weeks post injury     Plan:      She is mildly clinically improved.  Rest, ice, compression, elevation (RICE) therapy as previous.  WBAT. Walker boot as previous. Start weaning protocol next week.  Tylenol prn. Ankle brace as directed.   RTC 2 weeks for follow up.   "

## 2023-01-07 ENCOUNTER — HOSPITAL ENCOUNTER (EMERGENCY)
Facility: HOSPITAL | Age: 53
Discharge: HOME | End: 2023-01-07
Attending: EMERGENCY MEDICINE
Payer: MEDICAID

## 2023-01-07 VITALS
RESPIRATION RATE: 18 BRPM | TEMPERATURE: 98 F | SYSTOLIC BLOOD PRESSURE: 148 MMHG | BODY MASS INDEX: 17.97 KG/M2 | OXYGEN SATURATION: 98 % | WEIGHT: 95.19 LBS | HEART RATE: 87 BPM | DIASTOLIC BLOOD PRESSURE: 87 MMHG | HEIGHT: 61 IN

## 2023-01-07 DIAGNOSIS — M62.838 NECK MUSCLE SPASM: Primary | ICD-10-CM

## 2023-01-07 PROCEDURE — 99284 EMERGENCY DEPT VISIT MOD MDM: CPT

## 2023-01-07 PROCEDURE — 25000003 PHARM REV CODE 250: Performed by: NURSE PRACTITIONER

## 2023-01-07 PROCEDURE — 63600175 PHARM REV CODE 636 W HCPCS: Performed by: NURSE PRACTITIONER

## 2023-01-07 RX ORDER — PREDNISONE 20 MG/1
20 TABLET ORAL
Status: COMPLETED | OUTPATIENT
Start: 2023-01-07 | End: 2023-01-07

## 2023-01-07 RX ORDER — CYCLOBENZAPRINE HCL 10 MG
10 TABLET ORAL 3 TIMES DAILY PRN
Qty: 15 TABLET | Refills: 0 | Status: SHIPPED | OUTPATIENT
Start: 2023-01-07 | End: 2023-01-12

## 2023-01-07 RX ORDER — METHYLPREDNISOLONE 4 MG/1
TABLET ORAL
Qty: 1 EACH | Refills: 0 | Status: SHIPPED | OUTPATIENT
Start: 2023-01-07 | End: 2023-01-28

## 2023-01-07 RX ORDER — CYCLOBENZAPRINE HCL 10 MG
10 TABLET ORAL
Status: COMPLETED | OUTPATIENT
Start: 2023-01-07 | End: 2023-01-07

## 2023-01-07 RX ADMIN — PREDNISONE 20 MG: 20 TABLET ORAL at 11:01

## 2023-01-07 RX ADMIN — CYCLOBENZAPRINE HYDROCHLORIDE 10 MG: 10 TABLET, FILM COATED ORAL at 11:01

## 2023-01-07 NOTE — ED PROVIDER NOTES
Encounter Date: 1/7/2023       History     Chief Complaint   Patient presents with    Neck Pain     Chronic neck pain due to disc problem.  Denies injury.      52 year old female with complaints of neck pain that is chronic. No known trauma.Patient states she is schedule to see a neurosurgeon for her neck pain soon. She takes tylenol or mortin for pain but it is not working.     Review of patient's allergies indicates:   Allergen Reactions    Sulfa (sulfonamide antibiotics) Rash    Diclofenac      Past Medical History:   Diagnosis Date    Bipolar depression     Stroke      Past Surgical History:   Procedure Laterality Date    GALLBLADDER SURGERY      HYSTERECTOMY      tonsilectomy       No family history on file.  Social History     Tobacco Use    Smoking status: Every Day     Packs/day: 1.00     Types: Cigarettes    Smokeless tobacco: Never     Review of Systems   Constitutional:  Negative for fever.   HENT:  Negative for sore throat.    Respiratory:  Negative for shortness of breath.    Cardiovascular:  Negative for chest pain.   Gastrointestinal:  Negative for nausea.   Genitourinary:  Negative for dysuria.   Musculoskeletal:  Positive for neck pain. Negative for back pain.   Skin:  Negative for rash.   Neurological:  Negative for weakness.   Hematological:  Does not bruise/bleed easily.     Physical Exam     Initial Vitals [01/07/23 1020]   BP Pulse Resp Temp SpO2   (!) 163/110 107 18 98.3 °F (36.8 °C) 97 %      MAP       --         Physical Exam    Nursing note and vitals reviewed.  Constitutional: Vital signs are normal. She appears well-developed and well-nourished. She is cooperative.   HENT:   Head: Normocephalic and atraumatic.   Right Ear: Hearing and external ear normal.   Left Ear: Hearing and external ear normal.   Nose: Nose normal.   Mouth/Throat: Uvula is midline, oropharynx is clear and moist and mucous membranes are normal.   Eyes: Conjunctivae, EOM and lids are normal. Pupils are equal, round,  and reactive to light.   Neck: Trachea normal. Neck supple.   Cardiovascular:  Normal rate, regular rhythm, S1 normal, S2 normal, normal heart sounds and normal pulses.           Pulmonary/Chest: Effort normal and breath sounds normal.   Musculoskeletal:      Cervical back: Neck supple. Decreased range of motion.     Neurological: She is alert.       ED Course   Procedures  Labs Reviewed - No data to display       Imaging Results    None          Medications   cyclobenzaprine tablet 10 mg (has no administration in time range)   predniSONE tablet 20 mg (has no administration in time range)     Medical Decision Making:   Differential Diagnosis:   Chronic neck pain, cervical strain.  ED Management:  After history and physical exam discussed with patient that she has chronic neck pain. Will treat her with muscle relaxants and steroids.                        Clinical Impression:   Final diagnoses:  [M62.838] Neck muscle spasm (Primary)        ED Disposition Condition    Discharge Stable          ED Prescriptions       Medication Sig Dispense Start Date End Date Auth. Provider    methylPREDNISolone (MEDROL DOSEPACK) 4 mg tablet  1 each 1/7/2023 1/28/2023 Hema Govea III, NP    cyclobenzaprine (FLEXERIL) 10 MG tablet Take 1 tablet (10 mg total) by mouth 3 (three) times daily as needed for Muscle spasms. 15 tablet 1/7/2023 1/12/2023 Hema Govea III, NP          Follow-up Information    None          Hema Govea III, NP  01/07/23 1144

## 2023-01-13 ENCOUNTER — PATIENT OUTREACH (OUTPATIENT)
Dept: EMERGENCY MEDICINE | Facility: HOSPITAL | Age: 53
End: 2023-01-13
Payer: MEDICAID

## 2023-01-20 ENCOUNTER — OFFICE VISIT (OUTPATIENT)
Dept: ORTHOPEDICS | Facility: CLINIC | Age: 53
End: 2023-01-20
Payer: MEDICAID

## 2023-01-20 DIAGNOSIS — S93.401D SPRAIN OF LIGAMENT OF RIGHT ANKLE, SUBSEQUENT ENCOUNTER: Primary | ICD-10-CM

## 2023-01-20 PROCEDURE — 99213 OFFICE O/P EST LOW 20 MIN: CPT | Mod: PBBFAC | Performed by: NURSE PRACTITIONER

## 2023-01-20 PROCEDURE — 99999 PR PBB SHADOW E&M-EST. PATIENT-LVL III: ICD-10-PCS | Mod: PBBFAC,,, | Performed by: NURSE PRACTITIONER

## 2023-01-20 PROCEDURE — 1160F PR REVIEW ALL MEDS BY PRESCRIBER/CLIN PHARMACIST DOCUMENTED: ICD-10-PCS | Mod: CPTII,,, | Performed by: NURSE PRACTITIONER

## 2023-01-20 PROCEDURE — 1159F MED LIST DOCD IN RCRD: CPT | Mod: CPTII,,, | Performed by: NURSE PRACTITIONER

## 2023-01-20 PROCEDURE — 1159F PR MEDICATION LIST DOCUMENTED IN MEDICAL RECORD: ICD-10-PCS | Mod: CPTII,,, | Performed by: NURSE PRACTITIONER

## 2023-01-20 PROCEDURE — 99213 OFFICE O/P EST LOW 20 MIN: CPT | Mod: S$PBB,,, | Performed by: NURSE PRACTITIONER

## 2023-01-20 PROCEDURE — 99213 PR OFFICE/OUTPT VISIT, EST, LEVL III, 20-29 MIN: ICD-10-PCS | Mod: S$PBB,,, | Performed by: NURSE PRACTITIONER

## 2023-01-20 PROCEDURE — 1160F RVW MEDS BY RX/DR IN RCRD: CPT | Mod: CPTII,,, | Performed by: NURSE PRACTITIONER

## 2023-01-20 PROCEDURE — 99999 PR PBB SHADOW E&M-EST. PATIENT-LVL III: CPT | Mod: PBBFAC,,, | Performed by: NURSE PRACTITIONER

## 2023-01-20 NOTE — PROGRESS NOTES
Marialuisa Bean, Patient Care Assistant  ED Navigator  Emergency Department    [place] [facility] [place]  Role: Community Health Worker    Date: 01/20/2023  Patient Name: Kisha Ladd  MRN: [id number]  PCP: Kenton Degroot MD    Assessment:     Kisha Ladd is a 52 y.o. female who has presented to ED for neck pain. Patient has visited the ED 2 times in the past 3 months. Patient did not contact PCP.     ED Navigator Initial Assessment    ED Navigator Enrollment Documentation  Consent to Services  Does patient consent to completing the assessment?: Yes  Contact  Method of Initial Contact: Phone  Transportation  Does the patient have issues with Transportation?: No  Does the patient have transportation to and from healthcare appointments?: Yes  Insurance Coverage  Do you have coverage/adequate coverage?: Yes  Type/kind of coverage: [place] [employer]  Is patient able to afford co-pays/deductibles?: Yes  Is patient able to afford HME or supplies?: Yes  Does patient have an established [facility] PCP?: Yes  Able to access?: Yes  Does the patient have a lack of adequate coverage?: No  Specialist Appointment  Did the patient come to the ED to see a specialist?: No  Does the patient have a pending specialist referral?: No  Does the patient have a specialist appointment made?: No  PCP Follow Up Appointment  Has the patient had an appointment with a primary care provider in the past year?: No  Does the patient have a follow up appontment with a PCP?: No  When was the last time you saw your PCP?:  (Comment: Pt is unsure, but stated it has been over a year.)  Why does the patient not have a follow up scheduled?: Other (see comments) (Comment: Pt did not schedule, did not want assistance, and stated she would call soon.)  Medications  Is patient able to afford medication?: Yes  Is patient unable to get medication due to lack of transportation?: No  Psychological  Does the patient have psycho-social concerns?:  Yes  What concerns does the patient have?: Mental health, Anxiety and/or Depression  Food  Does the patient have concerns about food?: No  Communication/Education  Does the patient have limited English proficiency/English not primary language?: No  Does patient have low literacy and/or low health literacy?: Yes  Does patient have concerns with care?: No  Does patient have dissatisfaction with care?: No  Other Financial Concerns  Does the patient have immediate financial distress?: No  Does the patient have general financial concerns?: Yes  Other Social Barriers/Concerns  Does the patient have any additional barriers or concerns?: Housing concerns (Comment: Rent concerns.)  Primary Barrier  Barriers identified: Psychological barrier (mistrust, anxiety, etc.), Cognitive barrier (health literacy, language and communication, etc.)  Root Cause of ED Utilization: Patient Knowledge/Low Health Literacy  Plan to address Patient Knowledge/Low Health Literacy: Provided information for [facility] 24/7 Nurse triage line [phone] or 1-[facility] ([phone])  Next steps: Provided Education  Was education/educational materials provided surrounding PCP services/creating a medical home?: Yes Was education verbal or written?: Verbal, Written     Was education/educational materials provided surrounding low cost, healthy foods?: Yes Was education verbal or written?: Written     Was education/educational materials provided surrounding other items? If so, use comment to explain.: Yes Was education verbal or written?: Written   Plan: Provided information for [facility] 24/7 Nurse triage line, [phone] or [facility] ([phone])  Expected Date of Follow Up 1: 1/30/23  Additional Documentation: Patient went to the orthopedic today for her ankle. Patient mentioned her neck is still in pain, and she has been seeing a neurosurgeon, and is hoping to get answers with the tests she will complete. Patient mentioned she  is due for a colonoscopy and mammogram. When inquired if she would like to schedule with PCP, she stated she will call. Patient still sees Kenton Degroot MD for her primary care needs, but expressed she has not seen him in over a year. Patient could use resources to help with rent. Patient provided her e-mail address as [email or web address]. Patient is agreeable to a follow-up call in a week or 2.    ED navigator ensured she could not help patient with appointments today. ED navigator provided patient with the following via e-mail: rent assistance resources, the [facility] 24/7 Nurse triage line, [phone] or [facility] ([phone]) contact information, and education on ([facility] Right [facility] information, [facility] Virtual Visit information, and Heart healthy diet tips). ED navigator encouraged to follow-up with her PCP, and to schedule in order to get an order for the mammogram and colonoscopy she stated she needs. ED navigator will follow-up with patient on/around 1/30/2023 to ensure she received the resources, see if she was able to schedule with her PCP, and to assist as needed.    Marialuisa Bean  ED Navigator- [facility]  [phone]           Social History     Socioeconomic History    Marital status:    Tobacco Use    Smoking status: Every Day     Packs/day: 1.00     Types: Cigarettes    Smokeless tobacco: Never     Social Determinants of Health     Financial Resource Strain: Medium Risk    Difficulty of Paying Living Expenses: Somewhat hard   Food Insecurity: No Food Insecurity    Worried About Running Out of Food in the Last Year: Never true    Ran Out of Food in the Last Year: Never true   Transportation Needs: No Transportation Needs    Lack of Transportation (Medical): No    Lack of Transportation (Non-Medical): No   Physical Activity: Sufficiently Active    Days of Exercise per Week: 5 days    Minutes of Exercise per Session: 30 min   Stress:  Stress Concern Present    Feeling of Stress : To some extent   Social Connections: Moderately Integrated    Frequency of Communication with Friends and Family: More than three times a week    Frequency of Social Gatherings with Friends and Family: Three times a week    Attends Gn[employer] Services: More than 4 times per year    Active Member of Clubs or Organizations: Yes    Attends Club or Organization Meetings: More than 4 times per year    Marital Status:    Housing Stability: Low Risk     Unable to Pay for Housing in the Last Year: No    Number of Places Lived in the Last Year: 1    Unstable Housing in the Last Year: No       Plan:     Patient went to the orthopedic today for her ankle. Patient mentioned her neck is still in pain, and she has been seeing a neurosurgeon, and is hoping to get answers with the tests she will complete. Patient mentioned she is due for a colonoscopy and mammogram. When inquired if she would like to schedule with PCP, she stated she will call. Patient still sees Kenton Degroot MD for her primary care needs, but expressed she has not seen him in over a year. Patient could use resources to help with rent. Patient provided her e-mail address as [email or web address]. Patient is agreeable to a follow-up call in a week or 2.    ED navigator ensured she could not help patient with appointments today. ED navigator provided patient with the following via e-mail: rent assistance resources, the [facility] 24/7 Nurse triage line, [phone] or [facility] ([phone]) contact information, and education on (The Right Care at the Right Level information, [facility] Virtual Visit information, and Heart healthy diet tips). ED navigator encouraged to follow-up with her PCP, and to schedule in order to get an order for the mammogram and colonoscopy she stated she needs. ED navigator will follow-up with patient on/around 1/30/2023 to ensure she received the resources, see if she  was able to schedule with her PCP, and to assist as needed.    Marialuisa Gavin [facility]  [place]igator- [place]  [phone]

## 2023-01-20 NOTE — PROGRESS NOTES
"  Subjective:      Follow up: 6 week follow up RT ankle injury     Kisha Ladd is a 52 y.o. female who presents for follow up for right ankle pain.  Date of injury was on 12/07/22. She states that tenderness and swelling over the lateral ankle has resolved. She presents in her normal shoewear.  She states that she is doing well. She denies charles instability.     The following portions of the patient's history were reviewed and updated as appropriate: allergies, current medications, past family history, past medical history, past social history, past surgical history, and problem list.      Objective:       Ht 5' 1" (1.549 m)   Wt 44.5 kg (98 lb)   SpO2 95%   BMI 18.52 kg/m²      NVI  Normal gait  Right ankle:  No bruisin  Normal range of motion  Tenderness over lateral malleolus, ATFL has resolved.  No ankle instability.   No Achilles tenderness.       Left ankle:   normal  no effusion, full range of motion, no tenderness.  no bruising noted   Brisk cap refill.      Imaging:  None today     Assessment:      RT ankle sprain- 6 weeks post injury     Plan:      She is clinically improved.  Tylenol prn.   Continue ankle exercises.   RTC prn.  "

## 2023-01-30 ENCOUNTER — PATIENT OUTREACH (OUTPATIENT)
Dept: EMERGENCY MEDICINE | Facility: HOSPITAL | Age: 53
End: 2023-01-30
Payer: MEDICAID

## 2023-02-13 ENCOUNTER — PATIENT OUTREACH (OUTPATIENT)
Dept: EMERGENCY MEDICINE | Facility: HOSPITAL | Age: 53
End: 2023-02-13
Payer: MEDICAID

## 2023-02-13 NOTE — PROGRESS NOTES
Pt is unreachable for 1st F/U ED navigator will follow-up with patient on/around 2/13/2023 for attempt at 2nd.    Marialuisa Bean  ED Navigator- [place]  [phone]

## 2023-02-13 NOTE — PROGRESS NOTES
Patient is unreachable for 2nd F/U. ED navigator will follow-up with patient on/around 3/13/2023 for attempt at 3rd.    Marialuisa Bean  ED Navigator- [place]  [phone]

## 2023-03-13 ENCOUNTER — PATIENT OUTREACH (OUTPATIENT)
Dept: EMERGENCY MEDICINE | Facility: HOSPITAL | Age: 53
End: 2023-03-13
Payer: MEDICAID

## 2023-03-13 PROBLEM — I63.512 ACUTE ISCHEMIC LEFT MCA STROKE: Status: RESOLVED | Noted: 2022-12-09 | Resolved: 2023-03-13

## 2023-03-21 NOTE — PROGRESS NOTES
Patient is doing better since getting her teeth pulled. Patient went to a F/U appointment today. Patient has been seeing a neurosurgeon, and will be placed in PT, hoping for that to assist her pain. Patient has no needs that could be assisted with.    ED navigator ensured there was nothing she could help patient with today. ED navigator inquired how patient was doing. ED navigator reminded patient to reach out if there is ever anything she can assist with. ED navigator will follow-up with patient on/around 4/25/2023.    Marialuisa Bean  ED Navigator- [place]  [phone]

## 2023-04-01 ENCOUNTER — HOSPITAL ENCOUNTER (EMERGENCY)
Facility: HOSPITAL | Age: 53
Discharge: HOME | End: 2023-04-01
Attending: EMERGENCY MEDICINE
Payer: MEDICAID

## 2023-04-01 VITALS
DIASTOLIC BLOOD PRESSURE: 75 MMHG | RESPIRATION RATE: 18 BRPM | HEIGHT: 61 IN | BODY MASS INDEX: 18.62 KG/M2 | SYSTOLIC BLOOD PRESSURE: 140 MMHG | WEIGHT: 98.63 LBS | HEART RATE: 85 BPM | OXYGEN SATURATION: 97 % | TEMPERATURE: 98 F

## 2023-04-01 DIAGNOSIS — N76.0 BACTERIAL VAGINOSIS: Primary | ICD-10-CM

## 2023-04-01 DIAGNOSIS — B96.89 BACTERIAL VAGINOSIS: Primary | ICD-10-CM

## 2023-04-01 PROCEDURE — 99283 EMERGENCY DEPT VISIT LOW MDM: CPT

## 2023-04-01 PROCEDURE — 25000003 PHARM REV CODE 250: Performed by: NURSE PRACTITIONER

## 2023-04-01 RX ORDER — METRONIDAZOLE 500 MG/1
500 TABLET ORAL
Status: COMPLETED | OUTPATIENT
Start: 2023-04-01 | End: 2023-04-01

## 2023-04-01 RX ORDER — METRONIDAZOLE 500 MG/1
500 TABLET ORAL EVERY 12 HOURS
Qty: 14 TABLET | Refills: 0 | Status: SHIPPED | OUTPATIENT
Start: 2023-04-01 | End: 2023-04-08

## 2023-04-01 RX ADMIN — METRONIDAZOLE 500 MG: 500 TABLET ORAL at 10:04

## 2023-04-01 NOTE — ED PROVIDER NOTES
Encounter Date: 4/1/2023       History     Chief Complaint   Patient presents with    Vaginal Discharge     Patient reports foul smelling green discharge from vagina x 5 days.      52-year-old female with complaints of vaginal discharge x9 days.  Associated fall smell.  Denies fever, itching, pain.  Denies urinary symptoms.  Significant history for hysterectomy parents.  Patient stated she used monistat vaginall cream OTC for seven days had slight improvement but now has green thick mucous discharge with foul smell. No sexual partners x 1 year    Review of patient's allergies indicates:   Allergen Reactions    Sulfa (sulfonamide antibiotics) Rash    Diclofenac      Past Medical History:   Diagnosis Date    Bipolar depression     Stroke      Past Surgical History:   Procedure Laterality Date    GALLBLADDER SURGERY      HYSTERECTOMY      tonsilectomy       No family history on file.  Social History     Tobacco Use    Smoking status: Every Day     Packs/day: 1.00     Types: Cigarettes    Smokeless tobacco: Never     Review of Systems   Constitutional:  Negative for fever.   HENT:  Negative for sore throat.    Respiratory:  Negative for shortness of breath.    Cardiovascular:  Negative for chest pain.   Gastrointestinal:  Negative for nausea.   Genitourinary:  Positive for vaginal discharge. Negative for dysuria, pelvic pain, vaginal bleeding and vaginal pain.   Musculoskeletal:  Negative for back pain.   Skin:  Negative for rash.   Neurological:  Negative for weakness.   Hematological:  Does not bruise/bleed easily.     Physical Exam     Initial Vitals [04/01/23 0943]   BP Pulse Resp Temp SpO2   (!) 191/96 103 18 98.4 °F (36.9 °C) 97 %      MAP       --         Physical Exam    Nursing note and vitals reviewed.  Constitutional: Vital signs are normal. She appears well-developed and well-nourished. She is cooperative.   HENT:   Head: Normocephalic and atraumatic.   Right Ear: Hearing and external ear normal.   Left Ear:  Hearing and external ear normal.   Nose: Nose normal.   Mouth/Throat: Uvula is midline, oropharynx is clear and moist and mucous membranes are normal.   Eyes: Conjunctivae, EOM and lids are normal. Pupils are equal, round, and reactive to light.   Neck: Trachea normal. Neck supple.   Normal range of motion.   Full passive range of motion without pain.     Cardiovascular:  Normal rate, regular rhythm, S1 normal, S2 normal, normal heart sounds and normal pulses.           Pulmonary/Chest: Effort normal and breath sounds normal.   Genitourinary:    Pelvic exam was performed with patient prone.   There is no rash, tenderness, lesion or injury on the right labia. There is no rash, tenderness, lesion or injury on the left labia.   Musculoskeletal:      Cervical back: Full passive range of motion without pain, normal range of motion and neck supple.     Neurological: She is alert.       ED Course   Procedures  Labs Reviewed - No data to display       Imaging Results    None          Medications   metroNIDAZOLE tablet 500 mg (500 mg Oral Given 4/1/23 1000)     Medical Decision Making:   Differential Diagnosis:   Bacterial vaginosis  ED Management:  After history and physical exam discussed with patient that I believe she has a bacterial vaginosis overtreated with antibiotics.  Patient has follow-up with OBGYN or presyncope                        Clinical Impression:   Final diagnoses:  [N76.0, B96.89] Bacterial vaginosis (Primary)        ED Disposition Condition    Discharge Stable          ED Prescriptions       Medication Sig Dispense Start Date End Date Auth. Provider    metroNIDAZOLE (FLAGYL) 500 MG tablet Take 1 tablet (500 mg total) by mouth every 12 (twelve) hours. for 7 days 14 tablet 4/1/2023 4/8/2023 Hema Govea III, NP          Follow-up Information       Follow up With Specialties Details Why Contact Info    Kenton Degroot MD Family Medicine In 3 days If symptoms worsen [address] [facility]  [address]  [phone]               Hema Govea III, NP  04/01/23 1001

## 2023-04-25 ENCOUNTER — PATIENT OUTREACH (OUTPATIENT)
Dept: EMERGENCY MEDICINE | Facility: HOSPITAL | Age: 53
End: 2023-04-25
Payer: MEDICAID

## 2023-04-25 NOTE — PROGRESS NOTES
Pt is unreachable. Encounter will be closed, and pt should be contacted if/when he/she comes back to the ER again for a F/U call.    Marialuisa Bean  ED Navigator- [place]  [phone]

## 2023-07-08 ENCOUNTER — HOSPITAL ENCOUNTER (EMERGENCY)
Facility: HOSPITAL | Age: 53
Discharge: HOME | End: 2023-07-08
Attending: STUDENT IN AN ORGANIZED HEALTH CARE EDUCATION/TRAINING PROGRAM
Payer: MEDICAID

## 2023-07-08 VITALS
BODY MASS INDEX: 19.03 KG/M2 | RESPIRATION RATE: 18 BRPM | HEIGHT: 61 IN | TEMPERATURE: 98 F | SYSTOLIC BLOOD PRESSURE: 129 MMHG | DIASTOLIC BLOOD PRESSURE: 73 MMHG | HEART RATE: 104 BPM | OXYGEN SATURATION: 99 % | WEIGHT: 100.81 LBS

## 2023-07-08 DIAGNOSIS — S16.1XXA STRAIN OF NECK MUSCLE, INITIAL ENCOUNTER: Primary | ICD-10-CM

## 2023-07-08 DIAGNOSIS — M25.551 RIGHT HIP PAIN: ICD-10-CM

## 2023-07-08 PROCEDURE — 96372 THER/PROPH/DIAG INJ SC/IM: CPT

## 2023-07-08 PROCEDURE — 63600175 PHARM REV CODE 636 W HCPCS

## 2023-07-08 PROCEDURE — 25000003 PHARM REV CODE 250

## 2023-07-08 PROCEDURE — 99284 EMERGENCY DEPT VISIT MOD MDM: CPT

## 2023-07-08 RX ORDER — CYCLOBENZAPRINE HCL 10 MG
10 TABLET ORAL
Status: COMPLETED | OUTPATIENT
Start: 2023-07-08 | End: 2023-07-08

## 2023-07-08 RX ORDER — KETOROLAC TROMETHAMINE 10 MG/1
10 TABLET, FILM COATED ORAL EVERY 6 HOURS
Qty: 20 TABLET | Refills: 0 | Status: SHIPPED | OUTPATIENT
Start: 2023-07-08 | End: 2023-07-13

## 2023-07-08 RX ORDER — CYCLOBENZAPRINE HCL 10 MG
10 TABLET ORAL 3 TIMES DAILY PRN
Qty: 15 TABLET | Refills: 0 | Status: SHIPPED | OUTPATIENT
Start: 2023-07-08 | End: 2023-07-13

## 2023-07-08 RX ORDER — DEXAMETHASONE SODIUM PHOSPHATE 4 MG/ML
8 INJECTION, SOLUTION INTRA-ARTICULAR; INTRALESIONAL; INTRAMUSCULAR; INTRAVENOUS; SOFT TISSUE
Status: COMPLETED | OUTPATIENT
Start: 2023-07-08 | End: 2023-07-08

## 2023-07-08 RX ORDER — KETOROLAC TROMETHAMINE 30 MG/ML
30 INJECTION, SOLUTION INTRAMUSCULAR; INTRAVENOUS
Status: COMPLETED | OUTPATIENT
Start: 2023-07-08 | End: 2023-07-08

## 2023-07-08 RX ADMIN — CYCLOBENZAPRINE 10 MG: 10 TABLET, FILM COATED ORAL at 09:07

## 2023-07-08 RX ADMIN — KETOROLAC TROMETHAMINE 30 MG: 30 INJECTION INTRAMUSCULAR; INTRAVENOUS at 09:07

## 2023-07-08 RX ADMIN — DEXAMETHASONE SODIUM PHOSPHATE 8 MG: 4 INJECTION, SOLUTION INTRA-ARTICULAR; INTRALESIONAL; INTRAMUSCULAR; INTRAVENOUS; SOFT TISSUE at 09:07

## 2023-07-08 NOTE — DISCHARGE INSTRUCTIONS
Take medications as prescribed for pain.  Follow up with primary care and neurology if symptoms persist.

## 2023-07-08 NOTE — ED PROVIDER NOTES
Encounter Date: 7/8/2023       History     Chief Complaint   Patient presents with    Hip Pain     Complains of non-traumatic right hip and left neck pain upon waking up this morning. Hx of bulging disc in neck.      52-year-old female with history chronic neck and hip pain presents to ED with complaints of neck and pain since waking up this morning.  No improvement with extra-strength Tylenol today.  Denies any injury or trauma.  Pain symptoms similar to in the past.    The history is provided by the patient.   Review of patient's allergies indicates:   Allergen Reactions    Sulfa (sulfonamide antibiotics) Rash    Diclofenac      Past Medical History:   Diagnosis Date    Bipolar depression     Stroke      Past Surgical History:   Procedure Laterality Date    GALLBLADDER SURGERY      HYSTERECTOMY      tonsilectomy       No family history on file.  Social History     Tobacco Use    Smoking status: Every Day     Packs/day: 1.00     Types: Cigarettes    Smokeless tobacco: Never     Review of Systems   Constitutional:  Negative for fever.   HENT:  Negative for sore throat.    Eyes: Negative.    Respiratory:  Negative for shortness of breath.    Cardiovascular:  Negative for chest pain.   Gastrointestinal:  Negative for nausea.   Endocrine: Negative.    Genitourinary:  Negative for dysuria.   Musculoskeletal:  Positive for arthralgias (Right hip) and neck pain. Negative for back pain.   Skin:  Negative for rash.   Allergic/Immunologic: Negative.    Neurological:  Negative for weakness.   Hematological:  Does not bruise/bleed easily.   Psychiatric/Behavioral: Negative.       Physical Exam     Initial Vitals [07/08/23 0940]   BP Pulse Resp Temp SpO2   129/73 104 18 98.4 °F (36.9 °C) 99 %      MAP       --         Physical Exam    Nursing note and vitals reviewed.  Constitutional: She appears well-developed and well-nourished.   HENT:   Head: Normocephalic and atraumatic.   Eyes: EOM are normal.   Neck: Neck supple.    Normal range of motion.  Cardiovascular:  Normal rate and regular rhythm.           Pulmonary/Chest: Breath sounds normal. No respiratory distress. She has no wheezes.   Abdominal: She exhibits no distension.   Musculoskeletal:         General: No tenderness. Normal range of motion.      Cervical back: Normal range of motion and neck supple. No tenderness or bony tenderness.      Right hip: No deformity, tenderness, bony tenderness or crepitus.     Neurological: She is alert and oriented to person, place, and time.   Skin: Skin is warm and dry.   Psychiatric: Thought content normal.       ED Course   Procedures  Labs Reviewed - No data to display       Imaging Results    None          Medications   dexAMETHasone injection 8 mg (8 mg Intramuscular Given 7/8/23 0954)   cyclobenzaprine tablet 10 mg (10 mg Oral Given 7/8/23 0954)   ketorolac injection 30 mg (30 mg Intramuscular Given 7/8/23 0954)     Medical Decision Making:   History:   Old Medical Records: I decided to obtain old medical records.  ED Management:  52-year-old female to ED for above complaints.  She was complaining of neck pain which is chronic.  No cervical spinal tenderness noted.  She was full range of motion.  No meningeal signs.  Also c/o of some right-sided hip pain.  No tenderness noted on exam.  She was full range of motion.  She was ambulatory with a limp.  Deformity or shortening noted.  She was treated with Decadron, Flexeril, Toradol in ED. send her home with a prescription for Flexeril and Toradol.  She was to follow up with Neurology and primary care if symptoms do not improve.  She was stable for discharge.                        Clinical Impression:   Final diagnoses:  [M25.551] Right hip pain  [S16.1XXA] Strain of neck muscle, initial encounter (Primary)        ED Disposition Condition    Discharge Stable          ED Prescriptions       Medication Sig Dispense Start Date End Date Auth. Provider    cyclobenzaprine (FLEXERIL) 10 MG  tablet Take 1 tablet (10 mg total) by mouth 3 (three) times daily as needed. 15 tablet 7/8/2023 7/13/2023 Hector Estrada NP    ketorolac (TORADOL) 10 mg tablet Take 1 tablet (10 mg total) by mouth every 6 (six) hours. for 5 days 20 tablet 7/8/2023 7/13/2023 Hector Estrada NP          Follow-up Information       Follow up With Specialties Details Why Contact Info    Kenton Degroot MD Family Medicine In 2 days  [address] [facility] [address]  [phone]               Hector Estrada NP  07/08/23 7210

## 2023-09-23 ENCOUNTER — HOSPITAL ENCOUNTER (EMERGENCY)
Facility: HOSPITAL | Age: 53
Discharge: HOME | End: 2023-09-23
Attending: EMERGENCY MEDICINE
Payer: MEDICAID

## 2023-09-23 VITALS
WEIGHT: 101 LBS | TEMPERATURE: 99 F | HEART RATE: 90 BPM | BODY MASS INDEX: 19.07 KG/M2 | SYSTOLIC BLOOD PRESSURE: 139 MMHG | HEIGHT: 61 IN | DIASTOLIC BLOOD PRESSURE: 63 MMHG | OXYGEN SATURATION: 99 % | RESPIRATION RATE: 18 BRPM

## 2023-09-23 DIAGNOSIS — S99.922A FOOT INJURY, LEFT, INITIAL ENCOUNTER: ICD-10-CM

## 2023-09-23 PROCEDURE — 99283 EMERGENCY DEPT VISIT LOW MDM: CPT

## 2023-09-23 RX ORDER — METHOCARBAMOL 500 MG/1
500 TABLET, FILM COATED ORAL 2 TIMES DAILY
COMMUNITY
Start: 2023-09-11

## 2023-09-23 RX ORDER — HYDROCODONE BITARTRATE AND ACETAMINOPHEN 5; 325 MG/1; MG/1
1 TABLET ORAL 2 TIMES DAILY PRN
COMMUNITY
Start: 2023-09-11

## 2023-09-23 RX ORDER — IBUPROFEN 800 MG/1
800 TABLET ORAL 3 TIMES DAILY
COMMUNITY
Start: 2023-05-30

## 2023-09-23 NOTE — DISCHARGE INSTRUCTIONS
Preliminary read of x-ray negative.  Take pain medication already prescribed per MD. wear Ace wrap to decrease chances of swelling.  Elevate leg whenever possible.

## 2023-09-23 NOTE — ED PROVIDER NOTES
Encounter Date: 9/23/2023       History     Chief Complaint   Patient presents with    Foot Injury     Pt stating she was walking when tripping and rolling her L ankle. Pt presents to ED with bruising and pain to L lateral ankle.      52-year-old female presents to the emergency room with left foot pain swelling bruising after a missed step prior to arrival.  Patient is ambulatory but with a limp        Review of patient's allergies indicates:   Allergen Reactions    Sulfa (sulfonamide antibiotics) Rash    Diclofenac      Past Medical History:   Diagnosis Date    Bipolar depression     Stroke      Past Surgical History:   Procedure Laterality Date    GALLBLADDER SURGERY      HYSTERECTOMY      tonsilectomy       History reviewed. No pertinent family history.  Social History     Tobacco Use    Smoking status: Every Day     Current packs/day: 1.00     Types: Cigarettes    Smokeless tobacco: Never     Review of Systems   Constitutional:  Negative for fever.   HENT:  Negative for sore throat.    Respiratory:  Negative for shortness of breath.    Cardiovascular:  Negative for chest pain.   Gastrointestinal:  Negative for nausea.   Genitourinary:  Negative for dysuria.   Musculoskeletal:  Positive for arthralgias and myalgias. Negative for back pain.   Skin:  Positive for color change. Negative for rash.   Neurological:  Negative for weakness.   Hematological:  Does not bruise/bleed easily.   All other systems reviewed and are negative.      Physical Exam     Initial Vitals [09/23/23 1354]   BP Pulse Resp Temp SpO2   139/63 90 18 98.6 °F (37 °C) 99 %      MAP       --         Physical Exam    Nursing note and vitals reviewed.  Constitutional: She appears well-developed and well-nourished.   HENT:   Head: Normocephalic and atraumatic.   Eyes: Pupils are equal, round, and reactive to light.   Neck:   Normal range of motion.  Musculoskeletal:         General: Tenderness and edema present.      Cervical back: Normal range of  motion.     Neurological: She is alert and oriented to person, place, and time.   Skin: Skin is warm and dry.   Bruising noted to top of left foot   Psychiatric: She has a normal mood and affect.         ED Course   Procedures  Labs Reviewed - No data to display       Imaging Results              X-Ray Foot Complete Left (In process)                      Medications - No data to display  Medical Decision Making  Amount and/or Complexity of Data Reviewed  Radiology: ordered.                               Clinical Impression:   Final diagnoses:  [D08.929T] Foot injury, left, initial encounter        ED Disposition Condition    Discharge Stable          ED Prescriptions    None       Follow-up Information       Follow up With Specialties Details Why Contact Info    Kenton Degorot MD Family Medicine  As needed [address] [facility] [address]  [phone]               Amberly Garcia, LEONOR  09/23/23 0660

## 2023-09-27 ENCOUNTER — PATIENT OUTREACH (OUTPATIENT)
Dept: EMERGENCY MEDICINE | Facility: HOSPITAL | Age: 53
End: 2023-09-27
Payer: MEDICAID

## 2023-09-27 NOTE — PROGRESS NOTES
Pt is unreachable. Encounter will be closed, and pt should be contacted if/when he/she comes back to the ER again for a F/U call.    Marialuisa Bean  ED Navigator- [place]  [phone]

## 2024-04-16 DIAGNOSIS — M85.9 DISORDER OF BONE DENSITY AND STRUCTURE, UNSPECIFIED: Primary | ICD-10-CM

## 2024-04-16 DIAGNOSIS — Z12.31 ENCOUNTER FOR SCREENING MAMMOGRAM FOR BREAST CANCER: ICD-10-CM

## 2024-04-18 ENCOUNTER — HOSPITAL ENCOUNTER (EMERGENCY)
Facility: HOSPITAL | Age: 54
Discharge: HOME | End: 2024-04-18
Attending: EMERGENCY MEDICINE
Payer: MEDICAID

## 2024-04-18 VITALS
RESPIRATION RATE: 20 BRPM | BODY MASS INDEX: 19.26 KG/M2 | OXYGEN SATURATION: 100 % | TEMPERATURE: 99 F | DIASTOLIC BLOOD PRESSURE: 91 MMHG | SYSTOLIC BLOOD PRESSURE: 128 MMHG | HEART RATE: 88 BPM | WEIGHT: 102 LBS | HEIGHT: 61 IN

## 2024-04-18 DIAGNOSIS — M77.8 RIGHT WRIST TENDINITIS: Primary | ICD-10-CM

## 2024-04-18 PROCEDURE — 99283 EMERGENCY DEPT VISIT LOW MDM: CPT

## 2024-04-18 RX ORDER — PREDNISONE 20 MG/1
20 TABLET ORAL DAILY
Qty: 5 TABLET | Refills: 0 | Status: SHIPPED | OUTPATIENT
Start: 2024-04-18 | End: 2024-04-23

## 2024-04-18 NOTE — DISCHARGE INSTRUCTIONS
Use splint as needed. take medication as directed.  You should receive a phone call to schedule an appointment with the orthopedist.  Return to the emergency department for worsening condition.

## 2024-04-18 NOTE — ED PROVIDER NOTES
"Encounter Date: 4/18/2024       History     Chief Complaint   Patient presents with    Wrist Injury     Complains of right wrist injury from pulling weeds, swelling noted to the wrist area. Pain 8/10, took tylenol at 7am with no relief.         This is a 53-year-old white female with a history of hemorrhagic stroke who presents to the emergency department with complaints of right wrist pain.  She reports relatively chronic, intermittent right wrist pain that "flared up" yesterday after pulling weeds.  She has been evaluated for this in the past and denies change in symptoms from previous.  She has currently not taking any medications for her symptoms.      Review of patient's allergies indicates:   Allergen Reactions    Sulfa (sulfonamide antibiotics) Rash    Diclofenac      Past Medical History:   Diagnosis Date    Bipolar depression     Stroke      Past Surgical History:   Procedure Laterality Date    GALLBLADDER SURGERY      HYSTERECTOMY      tonsilectomy       No family history on file.  Social History     Tobacco Use    Smoking status: Every Day     Current packs/day: 1.00     Types: Cigarettes    Smokeless tobacco: Never     Review of Systems   Constitutional:  Negative for fever.   Musculoskeletal:  Positive for arthralgias and joint swelling.   Skin: Negative.        Physical Exam     Initial Vitals [04/18/24 0949]   BP Pulse Resp Temp SpO2   (!) 128/91 88 20 98.6 °F (37 °C) 100 %      MAP       --         Physical Exam    Nursing note and vitals reviewed.  Constitutional: She appears well-developed and well-nourished. She is active. No distress.   HENT:   Head: Normocephalic and atraumatic.   Eyes: EOM are normal. Pupils are equal, round, and reactive to light.   Neck: Neck supple.   Normal range of motion.  Cardiovascular:  Normal rate.           Pulmonary/Chest: No respiratory distress.   Musculoskeletal:         General: Tenderness present. Normal range of motion.        Hands:       Cervical back: " Normal range of motion and neck supple.     Neurological: She is alert and oriented to person, place, and time. GCS score is 15. GCS eye subscore is 4. GCS verbal subscore is 5. GCS motor subscore is 6.   Skin: Skin is warm and dry. Capillary refill takes less than 2 seconds.   Psychiatric: She has a normal mood and affect. Her behavior is normal. Thought content normal.         ED Course   Procedures  Labs Reviewed - No data to display       Imaging Results    None          Medications - No data to display  Medical Decision Making  Risk  Prescription drug management.                                      Clinical Impression:  Final diagnoses:  [M77.8] Right wrist tendinitis (Primary)          ED Disposition Condition    Discharge Stable          ED Prescriptions       Medication Sig Dispense Start Date End Date Auth. Provider    predniSONE (DELTASONE) 20 MG tablet Take 1 tablet (20 mg total) by mouth once daily. for 5 days 5 tablet 4/18/2024 4/23/2024 Cecile Maki NP          Follow-up Information       Follow up With Specialties Details Why Contact Info    PCP Follow UP  Schedule an appointment as soon as possible for a visit in 2 days for follow-up, for re-evaluation of today's complaint              Cecile Maki NP  04/18/24 1006

## 2024-04-25 DIAGNOSIS — M25.531 WRIST PAIN, ACUTE, RIGHT: Primary | ICD-10-CM

## 2024-04-30 ENCOUNTER — HOSPITAL ENCOUNTER (OUTPATIENT)
Dept: RADIOLOGY | Facility: HOSPITAL | Age: 54
Discharge: HOME | End: 2024-04-30
Attending: NURSE PRACTITIONER
Payer: MEDICAID

## 2024-04-30 DIAGNOSIS — M25.531 WRIST PAIN, ACUTE, RIGHT: ICD-10-CM

## 2024-04-30 PROCEDURE — 73110 X-RAY EXAM OF WRIST: CPT | Mod: TC,RT

## 2024-05-03 ENCOUNTER — OFFICE VISIT (OUTPATIENT)
Dept: OBSTETRICS AND GYNECOLOGY | Facility: CLINIC | Age: 54
End: 2024-05-03
Payer: MEDICAID

## 2024-05-03 VITALS
WEIGHT: 105 LBS | HEART RATE: 80 BPM | BODY MASS INDEX: 19.83 KG/M2 | DIASTOLIC BLOOD PRESSURE: 72 MMHG | HEIGHT: 61 IN | SYSTOLIC BLOOD PRESSURE: 139 MMHG

## 2024-05-03 DIAGNOSIS — R23.2 HOT FLASHES: ICD-10-CM

## 2024-05-03 DIAGNOSIS — Z01.419 ENCOUNTER FOR ANNUAL ROUTINE GYNECOLOGICAL EXAMINATION: Primary | ICD-10-CM

## 2024-05-03 DIAGNOSIS — Z90.710 HISTORY OF HYSTERECTOMY: ICD-10-CM

## 2024-05-03 DIAGNOSIS — N95.1 MENOPAUSAL SYMPTOMS: ICD-10-CM

## 2024-05-03 DIAGNOSIS — R61 NIGHT SWEATS: ICD-10-CM

## 2024-05-03 DIAGNOSIS — Z86.73 HISTORY OF ARTERIAL ISCHEMIC STROKE: ICD-10-CM

## 2024-05-03 PROCEDURE — 3008F BODY MASS INDEX DOCD: CPT | Mod: CPTII,,, | Performed by: OBSTETRICS & GYNECOLOGY

## 2024-05-03 PROCEDURE — 99999 PR PBB SHADOW E&M-EST. PATIENT-LVL III: CPT | Mod: PBBFAC,,, | Performed by: OBSTETRICS & GYNECOLOGY

## 2024-05-03 PROCEDURE — 3078F DIAST BP <80 MM HG: CPT | Mod: CPTII,,, | Performed by: OBSTETRICS & GYNECOLOGY

## 2024-05-03 PROCEDURE — 1160F RVW MEDS BY RX/DR IN RCRD: CPT | Mod: CPTII,,, | Performed by: OBSTETRICS & GYNECOLOGY

## 2024-05-03 PROCEDURE — 1159F MED LIST DOCD IN RCRD: CPT | Mod: CPTII,,, | Performed by: OBSTETRICS & GYNECOLOGY

## 2024-05-03 PROCEDURE — 3075F SYST BP GE 130 - 139MM HG: CPT | Mod: CPTII,,, | Performed by: OBSTETRICS & GYNECOLOGY

## 2024-05-03 PROCEDURE — 99213 OFFICE O/P EST LOW 20 MIN: CPT | Mod: PBBFAC | Performed by: OBSTETRICS & GYNECOLOGY

## 2024-05-03 PROCEDURE — 99386 PREV VISIT NEW AGE 40-64: CPT | Mod: S$PBB,,, | Performed by: OBSTETRICS & GYNECOLOGY

## 2024-05-03 RX ORDER — ATORVASTATIN CALCIUM 20 MG/1
TABLET, FILM COATED ORAL
COMMUNITY
Start: 2024-04-15

## 2024-05-03 RX ORDER — FEZOLINETANT 45 MG/1
45 TABLET, FILM COATED ORAL DAILY
Qty: 30 TABLET | Refills: 11 | Status: SHIPPED | OUTPATIENT
Start: 2024-05-03 | End: 2025-05-03

## 2024-05-03 NOTE — PROGRESS NOTES
Subjective:    Patient ID: Kisha Ladd is a 53 y.o. female.     Chief Complaint: Annual Well Woman Exam     History of Present Illness:  Kisha presents today for Annual Well Woman exam. .No LMP recorded. Patient has had a hysterectomy.. She is currently using no hormone therapy and she reports problems with Hot flashes, Night sweats, Vaginal dryness, and Trouble sleeping. She denies breast tenderness, denies masses, denies nipple discharge. She denies difficulty with urination. Bowel movements have not significantly changed.     Neurologist took her off her Premarin due to stroke history. She states that her symptoms are intense and needs help       Menstrual History:   No LMP recorded. Patient has had a hysterectomy..     OB History    No obstetric history on file.         Review of Systems   Constitutional:  Negative for chills, fatigue and fever.   Respiratory:  Negative for shortness of breath.    Cardiovascular:  Negative for chest pain.   Gastrointestinal:  Negative for abdominal pain, constipation, diarrhea and nausea.   Genitourinary:  Positive for hot flashes and vaginal dryness. Negative for bladder incontinence, dysuria, pelvic pain and vaginal bleeding.   Neurological:  Negative for headaches.   Psychiatric/Behavioral:  Negative for depression.          Objective:    Vital Signs:  Vitals:    05/03/24 1033   BP: 139/72   Pulse: 80       Physical Exam:  General:  alert, no distress   Skin:  Skin color, texture, turgor normal. No rashes or lesions   HEENT:  conjunctivae/corneas clear. PERRL.   Neck: supple, trachea midline, no adenopathy or thyromegaly   Respiratory:  clear to auscultation bilaterally   Heart:  regular rate and rhythm, S1, S2 normal, no murmur, click, rub or gallop   Breasts: Left Breast: Nipple protruding. No nipple discharge. No palpable masses, erythema, skin changes, tenderness, or adenopathy.  Right Breast: Nipple protruding. No nipple discharge. No palpable masses, erythema,  skin changes, tenderness, or adenopathy.   Abdomen:  Soft, non-tender. Bowel sounds normal. No masses,  no organomegaly   Pelvis: External genitalia: normal general appearance  Urinary system: urethral meatus normal, bladder nontender  Vaginal: atrophic mucosa  Cervix: removed surgically  Uterus: removed surgically  Adnexa: normal bimanual exam   Extremities: Normal ROM; no edema, no cyanosis   Neurological: Normal strength and tone. No focal numbness or weakness. Reflexes 2+ and equal.   Psychiatric: normal mood, speech, dress, and thought processes       Discussed that with her history of stroke she is no longer a candidate for hormone replacement therapy.  Explained that there is another medication that can be used to help with hot flashes and other menopausal symptoms.  Use and side effects of veozah discussed and she desires to proceed.  All questions answered      Assessment:      1. Encounter for annual routine gynecological examination    2. Menopausal symptoms    3. Hot flashes    4. History of arterial ischemic stroke    5. Night sweats    6. History of hysterectomy          Plan:      Encounter for annual routine gynecological examination  -     Liquid-Based Pap Smear, Screening    Menopausal symptoms  Comments:  estrogen therapy contraindicated  Orders:  -     fezolinetant (VEOZAH) 45 mg Tab; Take 45 mg by mouth Daily.  Dispense: 30 tablet; Refill: 11    Hot flashes    History of arterial ischemic stroke  -     fezolinetant (VEOZAH) 45 mg Tab; Take 45 mg by mouth Daily.  Dispense: 30 tablet; Refill: 11    Night sweats    History of hysterectomy        Health Maintenance and Screening:   Kisha was counseled on A.C.O.G. Pap guidelines and recommendations for yearly pelvic exams in addition to recommendations for yearly mammograms and monthly self breast exams, and adequate calcium and vitamin D in her diet.     A discussion of needed Health Maintenance Screening was done with Kisha. She was counseled that  she is overdue for Mammogram: She will schedule: already scheduled.     Michelle Peña MD, FACOG   05/03/2024 10:42 AM

## 2024-05-06 ENCOUNTER — OFFICE VISIT (OUTPATIENT)
Dept: ORTHOPEDICS | Facility: CLINIC | Age: 54
End: 2024-05-06
Payer: MEDICAID

## 2024-05-06 DIAGNOSIS — M25.531 WRIST PAIN, ACUTE, RIGHT: Primary | ICD-10-CM

## 2024-05-06 DIAGNOSIS — M77.8 RIGHT WRIST TENDINITIS: ICD-10-CM

## 2024-05-06 PROCEDURE — 99213 OFFICE O/P EST LOW 20 MIN: CPT | Mod: S$PBB,,, | Performed by: NURSE PRACTITIONER

## 2024-05-06 PROCEDURE — 1160F RVW MEDS BY RX/DR IN RCRD: CPT | Mod: CPTII,,, | Performed by: NURSE PRACTITIONER

## 2024-05-06 PROCEDURE — 99213 OFFICE O/P EST LOW 20 MIN: CPT | Mod: PBBFAC | Performed by: NURSE PRACTITIONER

## 2024-05-06 PROCEDURE — 1159F MED LIST DOCD IN RCRD: CPT | Mod: CPTII,,, | Performed by: NURSE PRACTITIONER

## 2024-05-06 PROCEDURE — 99999 PR PBB SHADOW E&M-EST. PATIENT-LVL III: CPT | Mod: PBBFAC,,, | Performed by: NURSE PRACTITIONER

## 2024-05-06 NOTE — PROGRESS NOTES
Subjective:      Kisha Ladd is a 53 y.o. female referred by [facility] for evaluation and treatment of right wrist pain. This is evaluated as a non-injury. The pain began gradually approx 6 weeks ago or so. She states that the discomfort started after pulling weeds. She states that the pain was mild at first but worsened over the past month. She presented to the ER on 04/18/24. No radiographs were done, she was diagnosed with tendonitis. She was prescribed prednisone which she completed and saw no relief with. She was referred to orthopedics. She has used an OTC brace, again minimal relief. The pain is located primarily in the radial area of the wrist and base of thumb.  She describes the symptoms as throbbing. Symptoms improve with mildly with bracing and OTC analgesics. She states that she take long term use narcotics and muscle relaxants for chronic spine pain. The symptoms are worse with extension, rotation, and movement. Treatment to date has been without significant relief.    Outside reports reviewed: ER records.    Medical History:  Past Medical History:   Diagnosis Date    Bipolar depression     Stroke        Surgical History:  Past Surgical History:   Procedure Laterality Date    GALLBLADDER SURGERY      HYSTERECTOMY      tonsilectomy         Family History:  No family history on file.    Allergies:   Review of patient's allergies indicates:   Allergen Reactions    Sulfa (sulfonamide antibiotics) Rash    Codeine phosphate Hallucinations    Diclofenac     Bupropion hcl Rash          Review of Systems   Musculoskeletal:  Positive for joint pain (Wrist pain).   Neurological: Negative.    Psychiatric/Behavioral:  Positive for depression.    Objective:     NVI  General :    alert, appears stated age, and cooperative   Gait:  Normal.   Right Upper Extremity  Swelling:  Dorsal radial wrist   Bruising:   none noted   Tenderness:   present in first radial dorsal compartment.   Wrist ROM:   Limited due to  pain   Atrophy:  absent   Strength:    4/5, wrist extension 4/5, and wrist flexion 4/5, interossi 5/5   Sensation:  normal   Two-Point Discr.:   normal   Phalen's:   negative/negative   Tinel's   negative/negative     Imaging  X-Ray: RT Wrist 3 V ordered and reviewed by me today  No acute fracture or dislocation detected.  Joint spaces are well maintained.     Impression:     No acute finding detected.     Assessment:     Right wrist pain, probable tenisynovitis     Plan:     Radiographs negative. Suspect DeQuervains tenosynovitis.  Ice, activity rest and placed she was placed in a thumb spica brace for joint immobilization. Instructed on usage and application.   Nsaid as previous. Biofreeze prn.  Reviewed physician directed exercises and stretches for DeQuervains. Will be done 3 x wk x 6 wks. Packet given.   RTC 3 weeks for follow up.  She had no further questions.

## 2024-05-09 ENCOUNTER — HOSPITAL ENCOUNTER (OUTPATIENT)
Dept: RADIOLOGY | Facility: HOSPITAL | Age: 54
Discharge: HOME | End: 2024-05-09
Attending: NURSE PRACTITIONER
Payer: MEDICAID

## 2024-05-09 VITALS — HEIGHT: 61 IN | BODY MASS INDEX: 19.83 KG/M2 | WEIGHT: 105 LBS

## 2024-05-09 DIAGNOSIS — M85.9 DISORDER OF BONE DENSITY AND STRUCTURE, UNSPECIFIED: ICD-10-CM

## 2024-05-09 DIAGNOSIS — Z12.31 ENCOUNTER FOR SCREENING MAMMOGRAM FOR BREAST CANCER: ICD-10-CM

## 2024-05-09 DIAGNOSIS — Z12.11 SPECIAL SCREENING FOR MALIGNANT NEOPLASMS, COLON: Primary | ICD-10-CM

## 2024-05-09 PROCEDURE — 77091 TBS TECHL CALCULATION ONLY: CPT

## 2024-05-09 PROCEDURE — 77063 BREAST TOMOSYNTHESIS BI: CPT | Mod: TC

## 2024-05-09 PROCEDURE — 77067 SCR MAMMO BI INCL CAD: CPT | Mod: TC

## 2024-05-09 RX ORDER — SODIUM CHLORIDE 9 MG/ML
INJECTION, SOLUTION INTRAVENOUS CONTINUOUS
Status: CANCELLED | OUTPATIENT
Start: 2024-05-09

## 2024-05-09 RX ORDER — SODIUM CHLORIDE 0.9 % (FLUSH) 0.9 %
10 SYRINGE (ML) INJECTION
Status: CANCELLED | OUTPATIENT
Start: 2024-05-09

## 2024-05-09 NOTE — DISCHARGE INSTRUCTIONS
BEFORE THE PROCEDURE:    REPORT ANY CHANGE IN YOUR PHYSICAL CONDITION TO YOUR DOCTOR IMMEDIATELY.  SELF ISOLATE AND CHECK TEMPERATURE DAILY, IF TEMP OVER 100, CALL PHYSICIAN IMMEDIATELY.    TRY TO REFRAIN FROM SMOKING AND ALCOHOL 72 HOURS BEFORE YOUR PROCEDURE.     THE DAY BEFORE YOUR PROCEDURE YOU WILL BE ON A  LIQUID DIET FROM WAKING IN THE MORNING UNTIL MIDNIGHT.    REFER TO YOUR PHYSICIANS INSTRUCTIONS ON LIQUID DIET AND COLON PREP.    NO MAKE UP, NAIL POLISH OR JEWELRY THE DAY OF PROCEDURE.      CHECK IN AT FIRST FLOOR REGISTRATION DESK at 930 am.     DAY OF YOUR PROCEDURE:    TAKE BLOOD PRESSURE MEDICATIONS THE MORNING OF YOUR PROCEDURE, WITH SMALL SIPS WATER, AS DIRECTED BY YOUR PHYSICIAN.   DO NOT TAKE ANY DIABETIC MEDICATIONS UNLESS DIRECTED TO DO SO BY YOUR PHYSICIAN.   CONTACT LENSES AND DENTURES MUST BE REMOVED.  A RESPONSIBLE ADULT MUST ACCOMPANY YOU HOME UPON DISCHARGE.   ONLY 1 VISITOR ALLOWED PER ROOM.     YOUR THOUGHTS AND OPINIONS HELP US TO BETTER SERVE YOU.     PLEASE PARTICIPATE IN SURVEYS ABOUT YOUR CARE.    TH[facility] FOR [facility]  [facility].

## 2024-05-10 LAB
HPV16+18+H RISK 12 DNA CVX-IMP: NORMAL
LIQUID BASED PAP SMEAR, SCREENING: NORMAL

## 2024-05-13 ENCOUNTER — HOSPITAL ENCOUNTER (OUTPATIENT)
Dept: PREADMISSION TESTING | Facility: HOSPITAL | Age: 54
Discharge: HOME | End: 2024-05-13
Attending: SURGERY
Payer: MEDICAID

## 2024-05-13 ENCOUNTER — HOSPITAL ENCOUNTER (OUTPATIENT)
Dept: PULMONOLOGY | Facility: HOSPITAL | Age: 54
Discharge: HOME | End: 2024-05-13
Attending: SURGERY
Payer: MEDICAID

## 2024-05-13 ENCOUNTER — ANESTHESIA EVENT (OUTPATIENT)
Dept: ENDOSCOPY | Facility: HOSPITAL | Age: 54
End: 2024-05-13
Payer: MEDICAID

## 2024-05-13 VITALS — WEIGHT: 103 LBS | BODY MASS INDEX: 19.45 KG/M2 | HEIGHT: 61 IN

## 2024-05-13 DIAGNOSIS — Z12.11 SPECIAL SCREENING FOR MALIGNANT NEOPLASMS, COLON: ICD-10-CM

## 2024-05-13 LAB
OHS QRS DURATION: 80 MS
OHS QTC CALCULATION: 410 MS

## 2024-05-13 PROCEDURE — 93010 ELECTROCARDIOGRAM REPORT: CPT | Mod: ,,, | Performed by: INTERNAL MEDICINE

## 2024-05-13 PROCEDURE — 93005 ELECTROCARDIOGRAM TRACING: CPT

## 2024-05-13 NOTE — ANESTHESIA PREPROCEDURE EVALUATION
05/13/2024  Kisha Ladd is a 53 y.o., female.      Pre-op Assessment    I have reviewed the Patient Summary Reports.    I have reviewed the NPO Status.   I have reviewed the Medications.     Review of Systems  Anesthesia Hx:  No problems with previous Anesthesia             Denies Family Hx of Anesthesia complications.    Denies Personal Hx of Anesthesia complications.                    Social:  Former Smoker       Cardiovascular:  Cardiovascular Normal                  ECG has been reviewed.                          Pulmonary:  Pulmonary Normal                       Renal/:  Renal/ Normal                 Hepatic/GI:  Hepatic/GI Normal                 Neurological:   CVA, residual symptoms        LEFT EYE                            Endocrine:  Endocrine Normal            Psych:  Psychiatric History   bipolar             Lab Results   Component Value Date    WBC 7.13 05/13/2024    HGB 12.8 05/13/2024    HCT 38.4 05/13/2024    MCV 90 05/13/2024     05/13/2024      CMP  Sodium   Date Value Ref Range Status   05/13/2024 137 136 - 145 mmol/L Final     Potassium   Date Value Ref Range Status   05/13/2024 4.2 3.5 - 5.1 mmol/L Final     Chloride   Date Value Ref Range Status   05/13/2024 105 95 - 110 mmol/L Final     CO2   Date Value Ref Range Status   05/13/2024 28 23 - 29 mmol/L Final     Glucose   Date Value Ref Range Status   05/13/2024 93 70 - 110 mg/dL Final     BUN   Date Value Ref Range Status   05/13/2024 7 6 - 20 mg/dL Final     Creatinine   Date Value Ref Range Status   05/13/2024 0.7 0.5 - 1.4 mg/dL Final     Calcium   Date Value Ref Range Status   05/13/2024 9.9 8.7 - 10.5 mg/dL Final     Total Protein   Date Value Ref Range Status   05/13/2024 7.2 6.0 - 8.4 g/dL Final     Albumin   Date Value Ref Range Status   05/13/2024 3.7 3.5 - 5.2 g/dL Final     Total Bilirubin   Date Value Ref  Pt refused bolus of LR due to HX of CHF. ER MD Mclain made aware, pt agreed to slow rate of 126, pt explained need for fluids due to abnormal lactic acid level, pt agreed to have fluids given slowly.   Range Status   05/13/2024 0.2 0.1 - 1.0 mg/dL Final     Comment:     For infants and newborns, interpretation of results should be based  on gestational age, weight and in agreement with clinical  observations.    Premature Infant recommended reference ranges:  Up to 24 hours.............<8.0 mg/dL  Up to 48 hours............<12.0 mg/dL  3-5 days..................<15.0 mg/dL  6-29 days.................<15.0 mg/dL    For patients on Eltrombopag therapy, use of Dimension [place] TBIL is   not   recommended.       Alkaline Phosphatase   Date Value Ref Range Status   05/13/2024 102 55 - 135 U/L Final     AST   Date Value Ref Range Status   05/13/2024 25 10 - 40 U/L Final     ALT   Date Value Ref Range Status   05/13/2024 22 10 - 44 U/L Final     Anion Gap   Date Value Ref Range Status   05/13/2024 4 3 - 11 mmol/L Final     eGFR   Date Value Ref Range Status   05/13/2024 >60.0 >60 mL/min/1.73 m^2 Final      Lab Results   Component Value Date    WBC 6.61 12/26/2021    HGB 13.2 12/26/2021    HCT 40.0 12/26/2021    MCV 92 12/26/2021     12/26/2021          Physical Exam  General: Well nourished    Airway:  Mallampati: I / I  Mouth Opening: Normal  TM Distance: Normal  Tongue: Normal  Neck ROM: Normal ROM    Dental:  Dentures    Chest/Lungs:  Clear to auscultation    Heart:  Rate: Normal  Rhythm: Regular Rhythm  Sounds: Normal        Anesthesia Plan  Type of Anesthesia, risks & benefits discussed:    Anesthesia Type: MAC  Intra-op Monitoring Plan: Standard ASA Monitors  Post Op Pain Control Plan: multimodal analgesia  Induction:  IV  Airway Plan: Direct  Informed Consent: Informed consent signed with the Patient and all parties understand the risks and agree with anesthesia plan.  All questions answered.   ASA Score: 2  Day of Surgery Review of History & Physical: I have interviewed and examined the patient. I have reviewed the patient's H&P dated: There are no significant changes.     Ready For Surgery From Anesthesia  Perspective.     .

## 2024-05-14 ENCOUNTER — HOSPITAL ENCOUNTER (OUTPATIENT)
Facility: HOSPITAL | Age: 54
Discharge: HOME | End: 2024-05-14
Attending: SURGERY | Admitting: SURGERY
Payer: MEDICAID

## 2024-05-14 ENCOUNTER — ANESTHESIA (OUTPATIENT)
Dept: ENDOSCOPY | Facility: HOSPITAL | Age: 54
End: 2024-05-14
Payer: MEDICAID

## 2024-05-14 DIAGNOSIS — K57.30 DIVERTICULOSIS LARGE INTESTINE W/O PERFORATION OR ABSCESS W/O BLEEDING: ICD-10-CM

## 2024-05-14 DIAGNOSIS — Z12.11 SPECIAL SCREENING FOR MALIGNANT NEOPLASMS, COLON: Primary | ICD-10-CM

## 2024-05-14 DIAGNOSIS — K62.1 RECTAL POLYP: ICD-10-CM

## 2024-05-14 PROCEDURE — 63600175 PHARM REV CODE 636 W HCPCS: Performed by: NURSE ANESTHETIST, CERTIFIED REGISTERED

## 2024-05-14 PROCEDURE — 37000008 HC ANESTHESIA 1ST 15 MINUTES: Performed by: SURGERY

## 2024-05-14 PROCEDURE — 25000003 PHARM REV CODE 250: Performed by: SURGERY

## 2024-05-14 PROCEDURE — 27201423 OPTIME MED/SURG SUP & DEVICES STERILE SUPPLY: Performed by: SURGERY

## 2024-05-14 PROCEDURE — 37000009 HC ANESTHESIA EA ADD 15 MINS: Performed by: SURGERY

## 2024-05-14 PROCEDURE — 45385 COLONOSCOPY W/LESION REMOVAL: CPT | Performed by: SURGERY

## 2024-05-14 RX ORDER — SODIUM CHLORIDE 9 MG/ML
INJECTION, SOLUTION INTRAVENOUS CONTINUOUS
Status: DISCONTINUED | OUTPATIENT
Start: 2024-05-14 | End: 2024-05-14 | Stop reason: HOSPADM

## 2024-05-14 RX ORDER — SODIUM CHLORIDE 0.9 % (FLUSH) 0.9 %
10 SYRINGE (ML) INJECTION
Status: DISCONTINUED | OUTPATIENT
Start: 2024-05-14 | End: 2024-05-14 | Stop reason: HOSPADM

## 2024-05-14 RX ORDER — PROPOFOL 10 MG/ML
INJECTION, EMULSION INTRAVENOUS
Status: DISCONTINUED | OUTPATIENT
Start: 2024-05-14 | End: 2024-05-14

## 2024-05-14 RX ADMIN — PROPOFOL 40 MG: 10 INJECTION, EMULSION INTRAVENOUS at 12:05

## 2024-05-14 RX ADMIN — PROPOFOL 40 MG: 10 INJECTION, EMULSION INTRAVENOUS at 01:05

## 2024-05-14 RX ADMIN — PROPOFOL 60 MG: 10 INJECTION, EMULSION INTRAVENOUS at 12:05

## 2024-05-14 RX ADMIN — SODIUM CHLORIDE: 9 INJECTION, SOLUTION INTRAVENOUS at 10:05

## 2024-05-14 NOTE — DISCHARGE INSTRUCTIONS
FOLLOW UP WITH DR LINDER AS SCHEDULED.    NO DRIVING OR DRINKING ALCOHOL FOR 24 HOURS.    CALL DR LINDER'S OFFICE FOR ANY QUESTIONS OR CONCERNS.  REPORT TO THE ER IF URGENT.    THANK YOU FOR CHOOSING [facility]!

## 2024-05-14 NOTE — PLAN OF CARE
Received pt to room 521 accompanied by SanaRN, pt aa&ox3, no c/o, snack provided. Call bell in reach. Call with needs.

## 2024-05-14 NOTE — OP NOTE
[facility] - Endoscopy  Colonoscopy Procedure  Operative Note    SUMMARY     Date of Procedure: 5/14/2024     Procedure: Procedure(s) (LRB):  COLONOSCOPY, WITH POLYPECTOMY USING SNARE (N/A)    Surgeons and Role:     * Michelle Molina MD - Primary    Assisting Surgeon: None     Patient location: GI    Pre-Operative Diagnosis: Special screening for malignant neoplasms, colon [Z12.11]    Post-Operative Diagnosis: Post-Op Diagnosis Codes:     * Special screening for malignant neoplasms, colon [Z12.11]  Rectal polyps     Indications:  Screening age for colon cancer          Procedure:                  The patient was brought in to the endoscopy suite where the risks, benefits, and alternatives of the procedure were described.  The patient was given the opportunity to ask questions and then signed informed consent.  Patient was positioned in the left lateral decubitus position, continuous monitoring was initiated, and supplemental oxygen was provided via nasal cannula.  Adequate sedation was achieved with the above mentioned medications and then titrated during the entire procedure.  Digital rectal exam was performed.  Under direct visualization the colonoscope was introduced through the anus in to the rectum.  The scope was then advanced to the cecum, which was identified by the ileocecal valve and appendiceal orifice.  Scope was then withdrawn and the mucosa was carefully examined in a circular fashion.  The entire colonic mucosa was examined.  Air was evacuated from the colon and the procedure was terminated.  The patient tolerated the procedure well and was able to be transferred to the recovery area in stable condition.    Findings:                 Digital rectal examination:  No palpable abnormality or significant hemorrhoidal disease                    Rectum:  2 sessile polyps at 20 and 10 cm.  Taken entirely with a hot snare.  The 20 cm was not retrieved.  The 10 was retrieved in a suction retrieval trap.                    Sigmoid:  Diverticulosis throughout with tortuosity        Descending:  No mucosal abnormalities         Transverse:  No mucosal abnormalities         Ascending:  No mucosal abnormalities        Cecum:  No mucosal abnormalities.  Appendiceal orifice and ileocecal valve appreciated.        Terminal Ileum:  Not intubated     Specimens:   Specimen (24h ago, onward)       Start     Ordered    05/14/24 1312  Specimen to Pathology, Surgery General Surgery  Once        Comments: Pre-op Diagnosis: Special screening for malignant neoplasms, colon [Z12.11]Procedure(s):COLONOSCOPY Number of specimens: 1Name of specimens: polyp @ 10cm @ 1314     References:    Click here for ordering Quick Tip   Question Answer Comment   Procedure Type: General Surgery    Release to patient Immediate        05/14/24 1316                      Estimated Blood Loss (EBL): * No values recorded between 5/14/2024 12:47 PM and 5/14/2024  1:21 PM *     Complications: No     Diagnostic Impression:  Rectal polyps, diverticulosis     Recommendations: Discharge patient to home. Patient has a contact number available for emergencies. The signs and symptoms of potential delayed complications were discussed with the patient. Return to normal activities tomorrow. Written discharge instructions were provided to the patient. Resume previous diet. Continue present medications. Await pathology results.    Disposition:  Recovery unit stable     Attestation: I performed the procedure.        Follow Up:             Future Appointments   Date Time Provider Department Center   5/27/2024  9:45 AM González Field NP [facility] ORTHO [facility]   5/6/2025  8:45 AM Michelle Peña MD [facility] OBGYN [facility]           Michelle Molina MD  5/14/2024

## 2024-05-14 NOTE — PLAN OF CARE
Pt aa&ox3, requests discharge, brother waiting on her in parking lot. No c/o. Wheeled to exit. Brother driving pt home.

## 2024-05-14 NOTE — DISCHARGE SUMMARY
Discharge Summary  General Surgery      Admit Date: 5/14/2024    Discharge Date :5/14/2024    Attending Physician: Michelle Molina     Discharge Physician: Michelle Molina    Discharged Condition: good    Discharge Diagnosis: Special screening for malignant neoplasms, colon [Z12.11]  Diverticulosis   Rectal polyp    Treatments/Procedures: Procedure(s) (LRB):  COLONOSCOPY, WITH POLYPECTOMY USING SNARE (N/A)    Hospital Course: Uneventful; Discharged home from Recovery    Significant Diagnostic Studies: none    Disposition: Home or Self Care    Diet:  Diverticular precautions    Follow up: Office 14 days    Activity: No restrictions.    Patient Instructions:   Current Discharge Medication List        CONTINUE these medications which have NOT CHANGED    Details   atorvastatin (LIPITOR) 20 MG tablet 1 tablet Orally Once a day for 30 days      DULoxetine (CYMBALTA) 60 MG capsule Take 60 mg by mouth once daily.      fezolinetant (VEOZAH) 45 mg Tab Take 45 mg by mouth Daily.  Qty: 30 tablet, Refills: 11    Comments: DX: N95.1, Z86.73  Associated Diagnoses: Menopausal symptoms; History of arterial ischemic stroke      HYDROcodone-acetaminophen (NORCO) 5-325 mg per tablet Take 1 tablet by mouth once daily. Takes once per day      methocarbamoL (ROBAXIN) 500 MG Tab Take 500 mg by mouth 2 (two) times daily.      mirtazapine (REMERON) 45 MG tablet Take 45 mg by mouth nightly.      OXcarbazepine (TRILEPTAL) 600 MG Tab Take 600 mg by mouth nightly. mood      QUEtiapine (SEROQUEL) 200 MG Tab Take 200 mg by mouth nightly.      omeprazole 20 mg TbEC 1 tablet 30 minutes before a meal             Discharge Procedure Orders   Diet general     Call MD for:  temperature >100.4     Call MD for:  persistent nausea and vomiting     Call MD for:  difficulty breathing, headache or visual disturbances     Call MD for:  severe uncontrolled pain     Call MD for:  persistent dizziness or light-headedness     Call MD for:  extreme fatigue      Activity as tolerated

## 2024-05-14 NOTE — TRANSFER OF CARE
Anesthesia Transfer of Care Note    Patient: Kisha Ladd    Procedure(s) Performed: Procedure(s) (LRB):  COLONOSCOPY, WITH POLYPECTOMY USING SNARE (N/A)    Patient location: OPS    Anesthesia Type: MAC    Transport from OR: Transported from OR on room air with adequate spontaneous ventilation    Post pain: adequate analgesia    Post assessment: no apparent anesthetic complications    Post vital signs: stable    Level of consciousness: awake, alert and oriented    Nausea/Vomiting: no nausea/vomiting    Complications: none    Transfer of care protocol was followed      Last vitals:     BP 90/60  P 68  R 16  O2 Sat 98%

## 2024-05-14 NOTE — ANESTHESIA POSTPROCEDURE EVALUATION
Anesthesia Post Evaluation    Patient: Kisha Ladd    Procedure(s) Performed: Procedure(s) (LRB):  COLONOSCOPY, WITH POLYPECTOMY USING SNARE (N/A)    Final Anesthesia Type: MAC      Patient location during evaluation: OPS  Patient participation: Yes- Able to Participate  Level of consciousness: awake  Post-procedure vital signs: reviewed and stable  Pain management: adequate  Airway patency: patent    PONV status at discharge: No PONV  Anesthetic complications: no      Cardiovascular status: blood pressure returned to baseline  Respiratory status: spontaneous ventilation  Hydration status: euvolemic  Follow-up not needed.              Vitals Value Taken Time   /85 05/14/24 1333   Temp 36.9 °C (98.4 °F) 05/14/24 1333   Pulse 83 05/14/24 1333   Resp 18 05/14/24 1333   SpO2 98 % 05/14/24 1333         No case tracking events are documented in the log.      Pain/Kellen Score: Kellen Score: 10 (5/14/2024  1:33 PM)

## 2024-05-21 VITALS
OXYGEN SATURATION: 98 % | TEMPERATURE: 98 F | DIASTOLIC BLOOD PRESSURE: 85 MMHG | HEART RATE: 83 BPM | SYSTOLIC BLOOD PRESSURE: 120 MMHG | RESPIRATION RATE: 18 BRPM

## 2025-02-22 ENCOUNTER — HOSPITAL ENCOUNTER (EMERGENCY)
Facility: HOSPITAL | Age: 55
Discharge: HOME | End: 2025-02-22
Attending: EMERGENCY MEDICINE
Payer: MEDICAID

## 2025-02-22 VITALS
TEMPERATURE: 98 F | SYSTOLIC BLOOD PRESSURE: 145 MMHG | BODY MASS INDEX: 17.07 KG/M2 | DIASTOLIC BLOOD PRESSURE: 65 MMHG | HEIGHT: 61 IN | RESPIRATION RATE: 18 BRPM | OXYGEN SATURATION: 100 % | WEIGHT: 90.38 LBS | HEART RATE: 98 BPM
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
## 2025-02-22 PROCEDURE — 25000003 PHARM REV CODE 250: Performed by: CLINICAL NURSE SPECIALIST

## 2025-02-22 PROCEDURE — 99284 EMERGENCY DEPT VISIT MOD MDM: CPT

## 2025-02-22 RX ORDER — AMOXICILLIN AND CLAVULANATE POTASSIUM 875; 125 MG/1; MG/1
1 TABLET, FILM COATED ORAL ONCE
Status: COMPLETED | OUTPATIENT
Start: 2025-02-22 | End: 2025-02-22

## 2025-02-22 RX ORDER — HYDROCODONE BITARTRATE AND ACETAMINOPHEN 5; 325 MG/1; MG/1
1 TABLET ORAL EVERY 6 HOURS PRN
Qty: 12 TABLET | Refills: 0 | Status: SHIPPED | OUTPATIENT
Start: 2025-02-22

## 2025-02-22 RX ORDER — MUPIROCIN 20 MG/G
1 OINTMENT TOPICAL
Status: COMPLETED | OUTPATIENT
Start: 2025-02-22 | End: 2025-02-22

## 2025-02-22 RX ORDER — AMOXICILLIN AND CLAVULANATE POTASSIUM 875; 125 MG/1; MG/1
1 TABLET, FILM COATED ORAL 2 TIMES DAILY
Qty: 14 TABLET | Refills: 0 | Status: SHIPPED | OUTPATIENT
Start: 2025-02-22

## 2025-02-22 RX ORDER — HYDROCODONE BITARTRATE AND ACETAMINOPHEN 5; 325 MG/1; MG/1
1 TABLET ORAL
Refills: 0 | Status: COMPLETED | OUTPATIENT
Start: 2025-02-22 | End: 2025-02-22

## 2025-02-22 RX ADMIN — HYDROCODONE BITARTRATE AND ACETAMINOPHEN 1 TABLET: 5; 325 TABLET ORAL at 11:02

## 2025-02-22 RX ADMIN — AMOXICILLIN AND CLAVULANATE POTASSIUM 1 TABLET: 875; 125 TABLET, FILM COATED ORAL at 11:02

## 2025-02-22 RX ADMIN — MUPIROCIN 1 TUBE: 20 OINTMENT TOPICAL at 11:02

## 2025-02-22 NOTE — ED NOTES
[facility] to talk with [facility].

## 2025-02-22 NOTE — ED PROVIDER NOTES
Encounter Date: 2/22/2025       History     Chief Complaint   Patient presents with    Animal Bite     Pt stated that she was walking yesterday evening and was bitten to left thigh by an unknown dog. (Incident occurred on [address] in  - not reported).      54-year-old female presents to the emergency room for dog bite to left upper thigh that occurred yesterday evening while she was walking.  Up-to-date with tetanus shots.        Review of patient's allergies indicates:   Allergen Reactions    Sulfa (sulfonamide antibiotics) Blisters    Codeine phosphate Hallucinations    Diclofenac      S/s cva    Bupropion hcl Rash     Past Medical History:   Diagnosis Date    Bipolar depression     Colon cancer screening 05/2024    Special screening for malignant neoplasms, colon 5/14/2024    Stroke 12/10/2022    Wears dentures     full     Past Surgical History:   Procedure Laterality Date    COLONOSCOPY, WITH POLYPECTOMY USING SNARE N/A 5/14/2024    Procedure: COLONOSCOPY, WITH POLYPECTOMY USING SNARE;  Surgeon: Michelle Molina MD;  Location: [facility];  Service: General;  Laterality: N/A;  6th 0930    GALLBLADDER SURGERY      HYSTERECTOMY      OOPHORECTOMY      tonsilectomy       Family History   Problem Relation Name Age of Onset    Alzheimer's disease Mother          5 stents    Diabetes Mother      Heart defect Mother      Kidney failure Father      Heart disease Father      No Known Problems Sister      No Known Problems Sister      No Known Problems Brother      Heart disease Brother          cabg    No Known Problems Maternal Grandmother      No Known Problems Maternal Grandfather      No Known Problems Paternal Grandmother      No Known Problems Paternal Grandfather      No Known Problems Daughter      No Known Problems Maternal Aunt      No Known Problems Maternal Uncle      No Known Problems Paternal Aunt      No Known Problems Paternal Uncle      No Known Problems Other      Breast cancer Neg Hx      Ovarian  cancer Neg Hx      BRCA 1/2 Neg Hx       Social History[1]  Review of Systems   Constitutional:  Negative for fever.   HENT:  Negative for sore throat.    Respiratory:  Negative for shortness of breath.    Cardiovascular:  Negative for chest pain.   Gastrointestinal:  Negative for nausea.   Genitourinary:  Negative for dysuria.   Musculoskeletal:  Negative for back pain.   Skin:  Positive for color change and wound. Negative for rash.   Neurological:  Negative for weakness.   Hematological:  Does not bruise/bleed easily.   All other systems reviewed and are negative.      Physical Exam     Initial Vitals [02/22/25 1057]   BP Pulse Resp Temp SpO2   (!) 159/87 (!) 115 18 97.9 °F (36.6 °C) 98 %      MAP       --         Physical Exam    Nursing note and vitals reviewed.  Constitutional: She appears well-developed and well-nourished.   HENT:   Head: Normocephalic and atraumatic.   Eyes: Pupils are equal, round, and reactive to light.   Neck:   Normal range of motion.  Musculoskeletal:         General: Tenderness and edema present. Normal range of motion.      Cervical back: Normal range of motion.     Neurological: She is alert and oriented to person, place, and time.   Skin: Skin is warm and dry. There is erythema.   See image in chart   Psychiatric: She has a normal mood and affect.         ED Course   Procedures  Labs Reviewed - No data to display       Imaging Results    None          Medications   HYDROcodone-acetaminophen 5-325 mg per tablet 1 tablet (1 tablet Oral Given 2/22/25 1126)   amoxicillin-clavulanate 875-125mg per tablet 1 tablet (1 tablet Oral Given 2/22/25 1126)   mupirocin 2 % ointment 1 Tube (1 Tube Topical (Top) Given 2/22/25 1126)     Medical Decision Making  Risk  Prescription drug management.                                      Clinical Impression:  Final diagnoses:  [W54.0XXA] Dog bite, initial encounter (Primary)          ED Disposition Condition    Discharge Stable          ED Prescriptions        Medication Sig Dispense Start Date End Date Auth. Provider    amoxicillin-clavulanate 875-125mg (AUGMENTIN) 875-125 mg per tablet Take 1 tablet by mouth 2 (two) times daily. 14 tablet 2/22/2025 -- Amberly Austin NP    HYDROcodone-acetaminophen (NORCO) 5-325 mg per tablet Take 1 tablet by mouth every 6 (six) hours as needed for Pain. 12 tablet 2/22/2025 -- Amberly Austin NP          Follow-up Information       Follow up With Specialties Details Why Contact Info    Heather Angel NP Family Medicine  As needed [address] [facility] [place] [address]  [phone]                   [1]   Social History  Tobacco Use    Smoking status: Former     Current packs/day: 1.00     Types: Cigarettes     Passive exposure: Past    Smokeless tobacco: Never   Substance Use Topics    Alcohol use: Not Currently    Drug use: Yes     Types: Marijuana     Comment: last used 05/12/2024        Amberly Austin NP  02/22/25 4495

## 2025-02-22 NOTE — DISCHARGE INSTRUCTIONS
Clean wound with soap and water, apply Bactroban ointment, cover with dressing to keep clean and dry.

## 2025-02-22 NOTE — ED NOTES
NEUROLOGICAL:   Patient is awake , alert , and oriented x 4 .   Moves all extremities without difficulty.   Patient reports no neuro complaints..  GCS 15    CARDIOVASCULAR:   S1 and S2 present, no murmurs, gallops, or rubs, rate regular , and pulses palpable (2+)    On palpation no edema noted , noted to none.   Patient reports no CV complaints.  .     RESPIRATORY:   Airway Clear, Open, and Patent.  Respirations are even and unlabored.   Breath sounds clear  to all lung fields.   Patient reports no respiratory complaints.     GASTROINTESTINAL:   Abdomen is soft  and non-tender x 4 quadrants. Bowel sounds are normoactive to all quadrants .   Patient reports no GI complaints .     SKIN:   Skin appears warm , dry , good turgor, color normal for race, and intact picture of bite in chart.   .

## 2025-04-09 ENCOUNTER — HOSPITAL ENCOUNTER (EMERGENCY)
Facility: HOSPITAL | Age: 55
Discharge: HOME | End: 2025-04-09
Attending: EMERGENCY MEDICINE
Payer: MEDICAID

## 2025-04-09 VITALS
RESPIRATION RATE: 18 BRPM | WEIGHT: 95 LBS | DIASTOLIC BLOOD PRESSURE: 63 MMHG | HEIGHT: 61 IN | SYSTOLIC BLOOD PRESSURE: 124 MMHG | BODY MASS INDEX: 17.94 KG/M2 | HEART RATE: 84 BPM | OXYGEN SATURATION: 98 % | TEMPERATURE: 99 F

## 2025-04-09 DIAGNOSIS — S46.812A STRAIN OF LEFT TRAPEZIUS MUSCLE: Primary | ICD-10-CM

## 2025-04-09 LAB
OHS QRS DURATION: 84 MS
OHS QTC CALCULATION: 421 MS

## 2025-04-09 PROCEDURE — 99284 EMERGENCY DEPT VISIT MOD MDM: CPT | Mod: 25

## 2025-04-09 PROCEDURE — 93010 ELECTROCARDIOGRAM REPORT: CPT | Mod: ,,, | Performed by: STUDENT IN AN ORGANIZED HEALTH CARE EDUCATION/TRAINING PROGRAM

## 2025-04-09 PROCEDURE — 63600175 PHARM REV CODE 636 W HCPCS: Performed by: EMERGENCY MEDICINE

## 2025-04-09 PROCEDURE — 25000003 PHARM REV CODE 250: Performed by: EMERGENCY MEDICINE

## 2025-04-09 PROCEDURE — 96372 THER/PROPH/DIAG INJ SC/IM: CPT | Performed by: EMERGENCY MEDICINE

## 2025-04-09 PROCEDURE — 93005 ELECTROCARDIOGRAM TRACING: CPT

## 2025-04-09 RX ORDER — DEXAMETHASONE SODIUM PHOSPHATE 4 MG/ML
8 INJECTION, SOLUTION INTRA-ARTICULAR; INTRALESIONAL; INTRAMUSCULAR; INTRAVENOUS; SOFT TISSUE
Status: COMPLETED | OUTPATIENT
Start: 2025-04-09 | End: 2025-04-09

## 2025-04-09 RX ORDER — METHOCARBAMOL 500 MG/1
1500 TABLET, FILM COATED ORAL
Status: COMPLETED | OUTPATIENT
Start: 2025-04-09 | End: 2025-04-09

## 2025-04-09 RX ORDER — OXCARBAZEPINE 150 MG/1
150 TABLET, FILM COATED ORAL NIGHTLY
COMMUNITY
Start: 2025-03-05

## 2025-04-09 RX ORDER — CYCLOBENZAPRINE HCL 10 MG
10 TABLET ORAL 3 TIMES DAILY PRN
Qty: 30 TABLET | Refills: 0 | Status: SHIPPED | OUTPATIENT
Start: 2025-04-09

## 2025-04-09 RX ORDER — PREDNISONE 20 MG/1
40 TABLET ORAL DAILY
Qty: 10 TABLET | Refills: 0 | Status: SHIPPED | OUTPATIENT
Start: 2025-04-09 | End: 2025-04-14

## 2025-04-09 RX ADMIN — DEXAMETHASONE SODIUM PHOSPHATE 8 MG: 4 INJECTION, SOLUTION INTRA-ARTICULAR; INTRALESIONAL; INTRAMUSCULAR; INTRAVENOUS; SOFT TISSUE at 10:04

## 2025-04-09 RX ADMIN — METHOCARBAMOL 1500 MG: 500 TABLET ORAL at 10:04

## 2025-04-09 NOTE — ED PROVIDER NOTES
Encounter Date: 4/9/2025       History     Chief Complaint   Patient presents with    Arm Pain     Pt stated that she has been experiencing left shoulder/arm pain for the past week. Denied recent fall / injury.       54-year-old female with a history of bipolar disorder presents the ER with left trapezius muscle spasms for the past week, no alleviating or contributing factors.  Denies any injury or whatsoever.  Denies fall.  Worse with movement of shoulder.  No elbow issue.  She is left handed as well.  No chest pain or shortness of breath.  Alert and oriented x4, GCS is 15.  History of muscle spasms in the past      Review of patient's allergies indicates:   Allergen Reactions    Sulfa (sulfonamide antibiotics) Blisters    Codeine phosphate Hallucinations    Diclofenac      S/s cva    Bupropion hcl Rash     Past Medical History:   Diagnosis Date    Bipolar depression     Colon cancer screening 05/2024    Special screening for malignant neoplasms, colon 5/14/2024    Stroke 12/10/2022    Wears dentures     full     Past Surgical History:   Procedure Laterality Date    COLONOSCOPY, WITH POLYPECTOMY USING SNARE N/A 5/14/2024    Procedure: COLONOSCOPY, WITH POLYPECTOMY USING SNARE;  Surgeon: Michelle Molina MD;  Location: [facility];  Service: General;  Laterality: N/A;  6th 0930    GALLBLADDER SURGERY      HYSTERECTOMY      OOPHORECTOMY      tonsilectomy       Family History   Problem Relation Name Age of Onset    Alzheimer's disease Mother          5 stents    Diabetes Mother      Heart defect Mother      Kidney failure Father      Heart disease Father      No Known Problems Sister      No Known Problems Sister      No Known Problems Brother      Heart disease Brother          cabg    No Known Problems Maternal Grandmother      No Known Problems Maternal Grandfather      No Known Problems Paternal Grandmother      No Known Problems Paternal Grandfather      No Known Problems Daughter      No Known Problems Maternal  Aunt      No Known Problems Maternal Uncle      No Known Problems Paternal Aunt      No Known Problems Paternal Uncle      No Known Problems Other      Breast cancer Neg Hx      Ovarian cancer Neg Hx      BRCA 1/2 Neg Hx       Social History[1]  Review of Systems   Constitutional:  Negative for fever.   HENT:  Negative for sore throat.    Respiratory:  Negative for shortness of breath.    Cardiovascular:  Negative for chest pain.   Gastrointestinal:  Negative for nausea.   Genitourinary:  Negative for dysuria.   Musculoskeletal:  Positive for myalgias. Negative for back pain.   Skin:  Negative for rash.   Neurological:  Negative for weakness.   Hematological:  Does not bruise/bleed easily.   All other systems reviewed and are negative.      Physical Exam     Initial Vitals [04/09/25 1021]   BP Pulse Resp Temp SpO2   124/63 84 18 98.5 °F (36.9 °C) 98 %      MAP       --         Physical Exam    Nursing note and vitals reviewed.  Constitutional: She appears well-developed and well-nourished. She is not diaphoretic. No distress.   HENT:   Head: Normocephalic and atraumatic.   Eyes: Conjunctivae and EOM are normal. Pupils are equal, round, and reactive to light.   Neck: Neck supple.   Normal range of motion.  Cardiovascular:  Normal rate, regular rhythm, normal heart sounds and intact distal pulses.           No murmur heard.  Pulmonary/Chest: Breath sounds normal. No respiratory distress. She has no wheezes. She has no rhonchi. She has no rales. She exhibits no tenderness.   Abdominal: Abdomen is soft. Bowel sounds are normal.   Musculoskeletal:         General: Tenderness present. No edema. Normal range of motion.      Cervical back: Normal range of motion and neck supple.      Comments: Reproducible tenderness left trapezius muscle, neurovascularly intact throughout, strong distal pulses.  No deformity, no signs of dislocation or fracture     Neurological: She is alert and oriented to person, place, and time. She has  normal strength. No cranial nerve deficit. GCS score is 15. GCS eye subscore is 4. GCS verbal subscore is 5. GCS motor subscore is 6.   Skin: Skin is warm and dry. Capillary refill takes less than 2 seconds.         ED Course   Procedures  Labs Reviewed - No data to display  EKG Readings: (Independently Interpreted)   Initial Reading: No STEMI. Rhythm: Normal Sinus Rhythm. Heart Rate: 71. Ectopy: No Ectopy. Conduction: Normal. ST Segments: Normal ST Segments. T Waves: Normal. Axis: Normal. Clinical Impression: Normal Sinus Rhythm       Imaging Results    None          Medications   dexAMETHasone injection 8 mg (8 mg Intramuscular Given 4/9/25 1029)   methocarbamoL tablet 1,500 mg (1,500 mg Oral Given 4/9/25 1028)     Medical Decision Making  Risk  Prescription drug management.                          Medical Decision Making:   Differential Diagnosis:   Left trapezius muscle strain, muscle spasm             Clinical Impression:  Final diagnoses:  [S46.812A] Strain of left trapezius muscle (Primary)          ED Disposition Condition    Discharge Stable          ED Prescriptions       Medication Sig Dispense Start Date End Date Auth. Provider    cyclobenzaprine (FLEXERIL) 10 MG tablet Take 1 tablet (10 mg total) by mouth 3 (three) times daily as needed for Muscle spasms. 30 tablet 4/9/2025 -- Ryan Anand MD    predniSONE (DELTASONE) 20 MG tablet Take 2 tablets (40 mg total) by mouth once daily. for 5 days 10 tablet 4/9/2025 4/14/2025 Ryan Anand MD          Follow-up Information       Follow up With Specialties Details Why Contact Info Additional Information    Primary care physician  In 2 days       [place] - Emergency Department Emergency Medicine  As needed [address] [place] [address] [phone] Floor [address]                 [1]   Social History  Tobacco Use    Smoking status: Former     Current packs/day: 1.00     Types: Cigarettes     Passive exposure: Past    Smokeless  tobacco: Never   Substance Use Topics    Alcohol use: Not Currently    Drug use: Yes     Types: Marijuana     Comment: last used 05/12/2024        Ryan Anand MD  04/09/25 1038

## 2025-04-20 ENCOUNTER — HOSPITAL ENCOUNTER (EMERGENCY)
Facility: HOSPITAL | Age: 55
Discharge: HOME | End: 2025-04-20
Attending: EMERGENCY MEDICINE
Payer: MEDICAID

## 2025-04-20 VITALS
HEIGHT: 61 IN | SYSTOLIC BLOOD PRESSURE: 123 MMHG | DIASTOLIC BLOOD PRESSURE: 70 MMHG | TEMPERATURE: 98 F | RESPIRATION RATE: 16 BRPM | HEART RATE: 84 BPM | WEIGHT: 93.94 LBS | BODY MASS INDEX: 17.74 KG/M2 | OXYGEN SATURATION: 100 %

## 2025-04-20 DIAGNOSIS — M25.512 CHRONIC LEFT SHOULDER PAIN: ICD-10-CM

## 2025-04-20 DIAGNOSIS — M62.830 SPASM OF LEFT TRAPEZIUS MUSCLE: Primary | ICD-10-CM

## 2025-04-20 DIAGNOSIS — G89.29 CHRONIC LEFT SHOULDER PAIN: ICD-10-CM

## 2025-04-20 PROCEDURE — 63600175 PHARM REV CODE 636 W HCPCS: Mod: JZ,TB | Performed by: EMERGENCY MEDICINE

## 2025-04-20 PROCEDURE — 96372 THER/PROPH/DIAG INJ SC/IM: CPT | Performed by: EMERGENCY MEDICINE

## 2025-04-20 PROCEDURE — 99284 EMERGENCY DEPT VISIT MOD MDM: CPT | Mod: 25

## 2025-04-20 PROCEDURE — 25000003 PHARM REV CODE 250: Performed by: EMERGENCY MEDICINE

## 2025-04-20 RX ORDER — KETOROLAC TROMETHAMINE 30 MG/ML
30 INJECTION, SOLUTION INTRAMUSCULAR; INTRAVENOUS
Status: COMPLETED | OUTPATIENT
Start: 2025-04-20 | End: 2025-04-20

## 2025-04-20 RX ORDER — METHOCARBAMOL 500 MG/1
1000 TABLET, FILM COATED ORAL 3 TIMES DAILY
Qty: 30 TABLET | Refills: 0 | Status: SHIPPED | OUTPATIENT
Start: 2025-04-20 | End: 2025-04-25

## 2025-04-20 RX ORDER — METHOCARBAMOL 500 MG/1
1500 TABLET, FILM COATED ORAL
Status: COMPLETED | OUTPATIENT
Start: 2025-04-20 | End: 2025-04-20

## 2025-04-20 RX ORDER — MELOXICAM 7.5 MG/1
7.5 TABLET ORAL 2 TIMES DAILY
Qty: 10 TABLET | Refills: 0 | Status: SHIPPED | OUTPATIENT
Start: 2025-04-20

## 2025-04-20 RX ADMIN — METHOCARBAMOL 1500 MG: 500 TABLET ORAL at 10:04

## 2025-04-20 RX ADMIN — KETOROLAC TROMETHAMINE 30 MG: 30 INJECTION, SOLUTION INTRAMUSCULAR; INTRAVENOUS at 10:04

## 2025-04-20 NOTE — ED PROVIDER NOTES
Encounter Date: 4/20/2025       History     Chief Complaint   Patient presents with    Shoulder Pain     Pt reports to ED with left shoulder pain. Pt reports she was seen here on the 4/9 and received medicine but the medicine did not help.      Patient presents emergency department for left shoulder pain.  She was seen on the 9th of this month for similar symptoms was provided pain medications.  She states that she has continued to have pain in her right shoulder.  She stated it started proximally beginning of this month that is worse with movement better with rest but no recent fevers or illnesses no known event to cause her symptoms.      Review of patient's allergies indicates:   Allergen Reactions    Sulfa (sulfonamide antibiotics) Blisters    Codeine phosphate Hallucinations    Diclofenac      S/s cva    Bupropion hcl Rash     Past Medical History:   Diagnosis Date    Bipolar depression     Colon cancer screening 05/2024    Special screening for malignant neoplasms, colon 5/14/2024    Stroke 12/10/2022    Wears dentures     full     Past Surgical History:   Procedure Laterality Date    COLONOSCOPY, WITH POLYPECTOMY USING SNARE N/A 5/14/2024    Procedure: COLONOSCOPY, WITH POLYPECTOMY USING SNARE;  Surgeon: Michelle Molina MD;  Location: [facility];  Service: General;  Laterality: N/A;  6th 0930    GALLBLADDER SURGERY      HYSTERECTOMY      OOPHORECTOMY      tonsilectomy       Family History   Problem Relation Name Age of Onset    Alzheimer's disease Mother          5 stents    Diabetes Mother      Heart defect Mother      Kidney failure Father      Heart disease Father      No Known Problems Sister      No Known Problems Sister      No Known Problems Brother      Heart disease Brother          cabg    No Known Problems Maternal Grandmother      No Known Problems Maternal Grandfather      No Known Problems Paternal Grandmother      No Known Problems Paternal Grandfather      No Known Problems Daughter      No  Known Problems Maternal Aunt      No Known Problems Maternal Uncle      No Known Problems Paternal Aunt      No Known Problems Paternal Uncle      No Known Problems Other      Breast cancer Neg Hx      Ovarian cancer Neg Hx      BRCA 1/2 Neg Hx       Social History[1]  Review of Systems   Musculoskeletal:         Left shoulder pain   All other systems reviewed and are negative.      Physical Exam     Initial Vitals [04/20/25 1029]   BP Pulse Resp Temp SpO2   (!) 159/109 101 18 98.2 °F (36.8 °C) 99 %      MAP       --         Physical Exam    Nursing note and vitals reviewed.  Constitutional: She appears well-developed and well-nourished. No distress.   HENT:   Head: Normocephalic and atraumatic.   Eyes: EOM are normal. Pupils are equal, round, and reactive to light.   Neck:   Normal range of motion.  Pulmonary/Chest: No respiratory distress.   Musculoskeletal:      Cervical back: Normal range of motion.      Comments: Pain with any passive or active range of motion left shoulder but no fluctuance induration erythema or warmth.  No signs of trauma.  Patient also has tight right-sided trapezius musculature     Psychiatric: She has a normal mood and affect.         ED Course   Procedures  Labs Reviewed - No data to display       Imaging Results              X-Ray Shoulder 2 or More Views Left (Final result)  Result time 04/20/25 11:10:23      Final result by Aram Raphael MD (04/20/25 11:10:23)                   Impression:      No acute abnormality.      Electronically signed by: Aram Raphael MD  Date:    04/20/2025  Time:    11:10               Narrative:    EXAMINATION:  XR SHOULDER COMPLETE 2 OR MORE VIEWS LEFT    CLINICAL HISTORY:  shoulder pain;    FINDINGS:  No fracture or dislocation.   Unremarkable soft tissues.                                       Medications   methocarbamoL tablet 1,500 mg (1,500 mg Oral Given 4/20/25 1057)   ketorolac injection 30 mg (30 mg Intramuscular Given 4/20/25 1056)     Medical  Decision Making  Amount and/or Complexity of Data Reviewed  Radiology: ordered.    Risk  Prescription drug management.                          Medical Decision Making:   Initial Assessment:   Patient well-appearing stable vitals acute on chronic left shoulder pain worse with movement better with rest do not feel this is due to ACS.  And no x-ray on file will obtain x-ray at this time for evaluation provide Robaxin and Toradol.  Differential Diagnosis:   Muscle spasms, strain, sprain, fracture, neoplasm, arthritis, and calcific tendinitis  ED Management:  Medication improvement patient's symptoms with medications provided.  X-ray shows no acute abnormalities.  Will provide sling for support orthopedic referral if symptoms most consistent with rotator cuff injury.             Clinical Impression:  Final diagnoses:  [M62.830] Spasm of left trapezius muscle (Primary)  [M25.512, G89.29] Chronic left shoulder pain          ED Disposition Condition    Discharge Good          ED Prescriptions    None       Follow-up Information       Follow up With Specialties Details Why Contact Info Additional Information    [place] - Emergency Department Emergency Medicine  As needed, If symptoms worsen [address] [facility] [id number] [phone] Floor [address]    González Field, NP Orthopedic Surgery Schedule an appointment as soon as possible for a visit   [address]  [address] [address]  [phone]                    [1]   Social History  Tobacco Use    Smoking status: Former     Current packs/day: 1.00     Types: Cigarettes     Passive exposure: Past    Smokeless tobacco: Never   Substance Use Topics    Alcohol use: Not Currently    Drug use: Yes     Types: Marijuana     Comment: last used 05/12/2024        Dong Vásquez MD  04/20/25 3206

## 2025-04-20 NOTE — DISCHARGE INSTRUCTIONS
Please follow up with orthopedic referral provided in today's discharge instructions.    A sling has been provided for support please do not wear for more than 6 hours a day.    Please take medications as prescribed.    Icing his shoulder 20 minutes at a time 2-3 times daily can also help with your discomfort.

## 2025-04-29 ENCOUNTER — OFFICE VISIT (OUTPATIENT)
Dept: ORTHOPEDICS | Facility: CLINIC | Age: 55
End: 2025-04-29
Payer: MEDICAID

## 2025-04-29 DIAGNOSIS — M54.12 CERVICAL RADICULAR PAIN: ICD-10-CM

## 2025-04-29 DIAGNOSIS — M62.830 SPASM OF LEFT TRAPEZIUS MUSCLE: Primary | ICD-10-CM

## 2025-04-29 DIAGNOSIS — M25.512 ACUTE PAIN OF LEFT SHOULDER: ICD-10-CM

## 2025-04-29 PROCEDURE — 99213 OFFICE O/P EST LOW 20 MIN: CPT | Mod: PBBFAC | Performed by: NURSE PRACTITIONER

## 2025-04-29 PROCEDURE — 99999 PR PBB SHADOW E&M-EST. PATIENT-LVL III: CPT | Mod: PBBFAC,,, | Performed by: NURSE PRACTITIONER

## 2025-04-29 RX ORDER — CYCLOBENZAPRINE HCL 10 MG
10 TABLET ORAL NIGHTLY PRN
Qty: 10 TABLET | Refills: 0 | Status: SHIPPED | OUTPATIENT
Start: 2025-04-29 | End: 2025-05-09

## 2025-04-29 RX ORDER — HYDROCODONE BITARTRATE AND ACETAMINOPHEN 5; 325 MG/1; MG/1
1 TABLET ORAL EVERY 6 HOURS PRN
Qty: 20 TABLET | Refills: 0 | Status: SHIPPED | OUTPATIENT
Start: 2025-04-29

## 2025-05-06 ENCOUNTER — OFFICE VISIT (OUTPATIENT)
Dept: OBSTETRICS AND GYNECOLOGY | Facility: CLINIC | Age: 55
End: 2025-05-06
Payer: MEDICAID

## 2025-05-06 VITALS
HEIGHT: 61 IN | BODY MASS INDEX: 18.12 KG/M2 | DIASTOLIC BLOOD PRESSURE: 66 MMHG | HEART RATE: 92 BPM | WEIGHT: 96 LBS | SYSTOLIC BLOOD PRESSURE: 102 MMHG

## 2025-05-06 DIAGNOSIS — R61 NIGHT SWEATS: ICD-10-CM

## 2025-05-06 DIAGNOSIS — B37.2 CUTANEOUS CANDIDIASIS: ICD-10-CM

## 2025-05-06 DIAGNOSIS — Z01.419 ENCOUNTER FOR ANNUAL ROUTINE GYNECOLOGICAL EXAMINATION: Primary | ICD-10-CM

## 2025-05-06 DIAGNOSIS — Z12.31 SCREENING MAMMOGRAM FOR BREAST CANCER: ICD-10-CM

## 2025-05-06 DIAGNOSIS — Z90.710 HISTORY OF HYSTERECTOMY: ICD-10-CM

## 2025-05-06 DIAGNOSIS — Z86.73 HISTORY OF ARTERIAL ISCHEMIC STROKE: ICD-10-CM

## 2025-05-06 PROCEDURE — 99213 OFFICE O/P EST LOW 20 MIN: CPT | Mod: PBBFAC | Performed by: OBSTETRICS & GYNECOLOGY

## 2025-05-06 PROCEDURE — 99999 PR PBB SHADOW E&M-EST. PATIENT-LVL III: CPT | Mod: PBBFAC,,, | Performed by: OBSTETRICS & GYNECOLOGY

## 2025-05-06 PROCEDURE — 1160F RVW MEDS BY RX/DR IN RCRD: CPT | Mod: CPTII,,, | Performed by: OBSTETRICS & GYNECOLOGY

## 2025-05-06 PROCEDURE — 3078F DIAST BP <80 MM HG: CPT | Mod: CPTII,,, | Performed by: OBSTETRICS & GYNECOLOGY

## 2025-05-06 PROCEDURE — 99396 PREV VISIT EST AGE 40-64: CPT | Mod: S$PBB,,, | Performed by: OBSTETRICS & GYNECOLOGY

## 2025-05-06 PROCEDURE — 1159F MED LIST DOCD IN RCRD: CPT | Mod: CPTII,,, | Performed by: OBSTETRICS & GYNECOLOGY

## 2025-05-06 PROCEDURE — 3008F BODY MASS INDEX DOCD: CPT | Mod: CPTII,,, | Performed by: OBSTETRICS & GYNECOLOGY

## 2025-05-06 PROCEDURE — 3074F SYST BP LT 130 MM HG: CPT | Mod: CPTII,,, | Performed by: OBSTETRICS & GYNECOLOGY

## 2025-05-06 RX ORDER — FEZOLINETANT 45 MG/1
1 TABLET, FILM COATED ORAL
COMMUNITY
Start: 2025-04-17

## 2025-05-06 RX ORDER — NYSTATIN 100000 [USP'U]/G
POWDER TOPICAL 2 TIMES DAILY
Qty: 60 G | Refills: 1 | Status: SHIPPED | OUTPATIENT
Start: 2025-05-06

## 2025-05-06 RX ORDER — CLOTRIMAZOLE 1 %
CREAM (GRAM) TOPICAL
Qty: 30 G | Refills: 1 | Status: SHIPPED | OUTPATIENT
Start: 2025-05-06 | End: 2026-05-06

## 2025-05-06 NOTE — PROGRESS NOTES
Subjective:    Patient ID: Kisha Ladd is a 54 y.o. female.     Chief Complaint: Annual Well Woman Exam     History of Present Illness:  Kisha presents today for Annual Well Woman exam. .No LMP recorded. Patient has had a hysterectomy.. She is currently using no hormone therapy and she reports problems with Hot flashes and Night sweats. She denies breast tenderness, denies masses, denies nipple discharge. She denies difficulty with urination. Bowel movements have not significantly changed.     Complains of a rash along her buttocks.  States it gets worse with sweating.  Has been using a cream but not very helpful      Menstrual History:   No LMP recorded. Patient has had a hysterectomy..     OB History          2    Para   2    Term   2            AB        Living             SAB        IAB        Ectopic        Multiple        Live Births                     Review of Systems   Constitutional:  Negative for chills, fatigue and fever.   Respiratory:  Negative for shortness of breath.    Cardiovascular:  Negative for chest pain.   Gastrointestinal:  Negative for abdominal pain, constipation, diarrhea and nausea.   Genitourinary:  Positive for hot flashes. Negative for bladder incontinence, dysuria, pelvic pain and vaginal bleeding.   Integumentary:  Positive for rash.   Neurological:  Negative for headaches.   Psychiatric/Behavioral:  Negative for depression.          Objective:    Vital Signs:  Vitals:    25 0854   BP: 102/66   Pulse: 92       Physical Exam:  General:  alert, no distress   Skin:  Skin color, texture, turgor normal. No rashes or lesions   HEENT:  conjunctivae/corneas clear. PERRL.   Neck: supple, trachea midline, no adenopathy or thyromegaly   Respiratory:  clear to auscultation bilaterally   Heart:  regular rate and rhythm, S1, S2 normal, no murmur, click, rub or gallop   Breasts: Left Breast: Nipple protruding. No nipple discharge. No palpable masses, erythema, skin  changes, tenderness, or adenopathy.  Right Breast: Nipple protruding. No nipple discharge. No palpable masses, erythema, skin changes, tenderness, or adenopathy.   Abdomen:  Soft, non-tender. Bowel sounds normal. No masses,  no organomegaly   Pelvis: External genitalia: normal general appearance, rash c/w yeast along gluteal crease  Urinary system: urethral meatus normal, bladder nontender  Vaginal: normal mucosa without prolapse or lesions  Cervix: removed surgically  Uterus: removed surgically  Adnexa: normal bimanual exam   Extremities: Normal ROM; no edema, no cyanosis   Neurological: Normal strength and tone. No focal numbness or weakness. Reflexes 2+ and equal.   Psychiatric: normal mood, speech, dress, and thought processes     Will prescribe Lotrimin cream and nystatin powder to help with her rash.        Assessment:      1. Encounter for annual routine gynecological examination    2. History of arterial ischemic stroke    3. History of hysterectomy    4. Cutaneous candidiasis    5. Screening mammogram for breast cancer    6. Night sweats          Plan:      Encounter for annual routine gynecological examination    History of arterial ischemic stroke    History of hysterectomy    Cutaneous candidiasis  -     clotrimazole (LOTRIMIN) 1 % cream; Apply to affected area 2 times daily  Dispense: 30 g; Refill: 1  -     nystatin (MYCOSTATIN) powder; Apply topically 2 (two) times daily.  Dispense: 60 g; Refill: 1    Screening mammogram for breast cancer  -     Mammo Digital Screening Bilat w/ Soy (XPD); Future; Expected date: 05/06/2025    Night sweats        Health Maintenance and Screening:   Kisha was counseled on A.C.O.G. Pap guidelines and recommendations for yearly pelvic exams in addition to recommendations for yearly mammograms and monthly self breast exams, and adequate calcium and vitamin D in her diet.     A discussion of needed Health Maintenance Screening was done with Kisha. She was counseled that she  is overdue for Mammogram: She will schedule: tomorrow.     Michelle Peña MD, FACOG   05/06/2025 9:08 AM

## 2025-05-24 ENCOUNTER — HOSPITAL ENCOUNTER (OUTPATIENT)
Dept: RADIOLOGY | Facility: HOSPITAL | Age: 55
Discharge: HOME | End: 2025-05-24
Attending: OBSTETRICS & GYNECOLOGY
Payer: MEDICAID

## 2025-05-24 VITALS — WEIGHT: 96 LBS | BODY MASS INDEX: 18.12 KG/M2 | HEIGHT: 61 IN

## 2025-05-24 DIAGNOSIS — Z12.31 SCREENING MAMMOGRAM FOR BREAST CANCER: ICD-10-CM

## 2025-05-24 PROCEDURE — 77063 BREAST TOMOSYNTHESIS BI: CPT | Mod: TC

## 2025-05-27 ENCOUNTER — RESULTS FOLLOW-UP (OUTPATIENT)
Dept: OBSTETRICS AND GYNECOLOGY | Facility: CLINIC | Age: 55
End: 2025-05-27

## 2025-05-28 RX ORDER — FEZOLINETANT 45 MG/1
1 TABLET, FILM COATED ORAL
Qty: 30 TABLET | Refills: 6 | Status: SHIPPED | OUTPATIENT
Start: 2025-05-28

## 2025-05-28 NOTE — TELEPHONE ENCOUNTER
Refill Routing Note   Medication(s) are not appropriate for processing by [facility] for the following reason(s):        Outside of protocol  No active prescription written by provider    [facility] action(s):  Route               Appointments  past 12m or future 3m with PCP    Date Provider   Last Visit   5/6/2025 Michelle Peña MD   Next Visit   Visit date not found Michelle Peña MD   ED visits in past 90 days: 2        Note composed:6:49 AM 05/28/2025

## 2025-06-02 ENCOUNTER — OFFICE VISIT (OUTPATIENT)
Dept: PAIN MEDICINE | Facility: CLINIC | Age: 55
End: 2025-06-02
Payer: MEDICAID

## 2025-06-02 VITALS
DIASTOLIC BLOOD PRESSURE: 76 MMHG | BODY MASS INDEX: 17.56 KG/M2 | WEIGHT: 93 LBS | SYSTOLIC BLOOD PRESSURE: 115 MMHG | HEIGHT: 61 IN | OXYGEN SATURATION: 97 % | HEART RATE: 85 BPM

## 2025-06-02 DIAGNOSIS — M25.78 DEGENERATION OF INTERVERTEBRAL DISC OF CERVICAL REGION WITH OSTEOPHYTE OF CERVICAL VERTEBRA: ICD-10-CM

## 2025-06-02 DIAGNOSIS — M50.30 DEGENERATION OF INTERVERTEBRAL DISC OF CERVICAL REGION WITH OSTEOPHYTE OF CERVICAL VERTEBRA: ICD-10-CM

## 2025-06-02 DIAGNOSIS — M47.812 ARTHROPATHY OF CERVICAL FACET JOINT: ICD-10-CM

## 2025-06-02 DIAGNOSIS — M54.2 CHRONIC NECK PAIN: ICD-10-CM

## 2025-06-02 DIAGNOSIS — G89.0 CENTRAL PAIN SYNDROME: ICD-10-CM

## 2025-06-02 DIAGNOSIS — G89.29 CHRONIC NECK PAIN: ICD-10-CM

## 2025-06-02 DIAGNOSIS — M62.9 MUSCULOSKELETAL DISORDER INVOLVING UPPER TRAPEZIUS MUSCLE: Primary | ICD-10-CM

## 2025-06-02 PROCEDURE — 99999 PR PBB SHADOW E&M-EST. PATIENT-LVL IV: CPT | Mod: PBBFAC,,, | Performed by: ANESTHESIOLOGY

## 2025-06-02 PROCEDURE — 99204 OFFICE O/P NEW MOD 45 MIN: CPT | Mod: S$PBB,,, | Performed by: ANESTHESIOLOGY

## 2025-06-02 PROCEDURE — 1159F MED LIST DOCD IN RCRD: CPT | Mod: CPTII,,, | Performed by: ANESTHESIOLOGY

## 2025-06-02 PROCEDURE — 3078F DIAST BP <80 MM HG: CPT | Mod: CPTII,,, | Performed by: ANESTHESIOLOGY

## 2025-06-02 PROCEDURE — 3008F BODY MASS INDEX DOCD: CPT | Mod: CPTII,,, | Performed by: ANESTHESIOLOGY

## 2025-06-02 PROCEDURE — 1160F RVW MEDS BY RX/DR IN RCRD: CPT | Mod: CPTII,,, | Performed by: ANESTHESIOLOGY

## 2025-06-02 PROCEDURE — 3074F SYST BP LT 130 MM HG: CPT | Mod: CPTII,,, | Performed by: ANESTHESIOLOGY

## 2025-06-02 PROCEDURE — 99214 OFFICE O/P EST MOD 30 MIN: CPT | Mod: PBBFAC | Performed by: ANESTHESIOLOGY

## 2025-06-02 RX ORDER — GABAPENTIN 300 MG/1
300 CAPSULE ORAL 3 TIMES DAILY
Qty: 90 CAPSULE | Refills: 11 | Status: SHIPPED | OUTPATIENT
Start: 2025-06-02 | End: 2026-06-02

## 2025-06-03 ENCOUNTER — TELEPHONE (OUTPATIENT)
Dept: PAIN MEDICINE | Facility: CLINIC | Age: 55
End: 2025-06-03
Payer: MEDICAID

## 2025-06-04 ENCOUNTER — TELEPHONE (OUTPATIENT)
Dept: PAIN MEDICINE | Facility: CLINIC | Age: 55
End: 2025-06-04
Payer: MEDICAID

## 2025-06-13 ENCOUNTER — CLINICAL SUPPORT (OUTPATIENT)
Facility: HOSPITAL | Age: 55
End: 2025-06-13
Payer: MEDICAID

## 2025-06-13 DIAGNOSIS — M50.30 DEGENERATION OF INTERVERTEBRAL DISC OF CERVICAL REGION WITH OSTEOPHYTE OF CERVICAL VERTEBRA: ICD-10-CM

## 2025-06-13 DIAGNOSIS — M54.2 CHRONIC NECK PAIN: ICD-10-CM

## 2025-06-13 DIAGNOSIS — M25.78 DEGENERATION OF INTERVERTEBRAL DISC OF CERVICAL REGION WITH OSTEOPHYTE OF CERVICAL VERTEBRA: ICD-10-CM

## 2025-06-13 DIAGNOSIS — M62.9 MUSCULOSKELETAL DISORDER INVOLVING UPPER TRAPEZIUS MUSCLE: ICD-10-CM

## 2025-06-13 DIAGNOSIS — G89.29 CHRONIC NECK PAIN: ICD-10-CM

## 2025-06-13 DIAGNOSIS — M47.812 ARTHROPATHY OF CERVICAL FACET JOINT: ICD-10-CM

## 2025-06-13 PROCEDURE — 97162 PT EVAL MOD COMPLEX 30 MIN: CPT

## 2025-06-13 PROCEDURE — 97110 THERAPEUTIC EXERCISES: CPT

## 2025-06-13 NOTE — PROGRESS NOTES
Outpatient Rehab    Physical Therapy Evaluation    Patient Name: Kisha Ladd  MRN: [id number]  YOB: 1970  Encounter Date: 6/13/2025    Therapy Diagnosis:   Encounter Diagnoses   Name Primary?    Musculoskeletal disorder involving upper trapezius muscle- left sided     Chronic neck pain     Arthropathy of cervical facet joint     Degeneration of intervertebral disc of cervical region with osteophyte of cervical vertebra      Physician: Guillermo Horton MD    Physician Orders: Eval and Treat  Medical Diagnosis: Musculoskeletal disorder involving upper trapezius muscle  Chronic neck pain  Arthropathy of cervical facet joint  Degeneration of intervertebral disc of cervical region with osteophyte of cervical vertebra  Surgical Diagnosis: Not applicable for this Episode   Surgical Date: Not applicable for this Episode    Visit # / Visits Authorized:  1 / 1  Insurance Authorization Period: 6/2/2025 to 6/2/2026  Date of Evaluation: 6/13/2025  Plan of Care Certification: 6/13/2025 to 7/25/2025     Time In: 0900   Time Out: 0945  Total Time (in minutes): 45     Intake Outcome Measure for FOTO Survey    Therapist reviewed FOTO scores for Kisha Ladd on 6/13/2025.   FOTO report - see Media section or FOTO account episode details.     Intake Score: 17%      Subjective   History of Present Illness  Kisha is a 54 y.o. female who reports to physical therapy with a chief concern of Patient with complaints of (L) side neck/shoulder/arm pain with onset about 1 month ago with 2 ER referrals, referral to Ortho NP and then referral to Dr. Hope. Patient is (L) UE dominant. Patient describes pain as sharp, shooting with spasms, and heaviness (L) UE. Patient with history of CVA affecting (L) UE about 3 years ago. Patient reports she had full recovery after CVA prior to onset of this new pain symptoms. Patient able to perform all activities around home with modifications. Patient performing all physician  "directed exercises, OTC remedies. Patient reports lateral and medial arm numbness/tingling and pain symptoms with numbness and tingling (L) UE thumb and pointer and middle fingers. Symptoms constant but varies in intesity. No specific aggravating symptoms. No relieving factors or treatments reported.  Patient trying to get MRI approved. Patient reports shots at ER "take the edge off"..                 Dominant Hand: Left    Employment  Employment Status: On disability          Past Medical History/Physical Systems Review:   Kisha Ladd  has a past medical history of Bipolar depression, Colon cancer screening, Special screening for malignant neoplasms, colon, Stroke, and Wears dentures.    Kisha Ladd  has a past surgical history that includes Hysterectomy; Gallbladder surgery; tonsilectomy; Oophorectomy; and colonoscopy, with polypectomy using snare (N/A, 5/14/2024).    Kisha has a current medication list which includes the following prescription(s): atorvastatin, clotrimazole, duloxetine, veozah, gabapentin, hydrocodone-acetaminophen, mirtazapine, nystatin, omeprazole, oxcarbazepine, oxcarbazepine, and quetiapine.    Review of patient's allergies indicates:   Allergen Reactions    Sulfa (sulfonamide antibiotics) Blisters    Codeine phosphate Hallucinations    Diclofenac      S/s cva    Bupropion hcl Rash        Objective   Posture  Patient presents with a Forward head position. Increased thoracic kyphosis is observed.   Shoulders are Elevated and Rounded. Left scapula is: Elevated and Upwardly Rotated                 Spinal Mobility  Hypomobile: Cervical  Cervical Mobility Details: Patient with severe guarding    Spinal Muscle Palpation  Right Spinal Muscle Palpation  Unremarkable: Cervical/Thoracic       Left Spinal Muscle Palpation  Abnormal: Cervical/Thoracic     TTP (left) cervical paraspinals, suboccipitals, scapular muscles, upper trap      Cervical Range of Motion   Active (deg) Passive " (deg) Pain   Flexion 48   Yes   Extension 52   Yes   Right Lateral Flexion 22   Yes   Right Rotation 50   Yes   Left Lateral Flexion 35   Yes   Left Rotation 55   Yes          Shoulder Range of Motion     Shoulder, Elbow, or Forearm Range of Motion Details: WFL but poor quality of movement (bilateral) upper extremity and pain (left) upper extremity. Patient with hypomobility (left) scapula.     Shoulder Strength - Planes of Motion   Right Strength Right Pain Left Strength Left  Pain   Flexion 4   3 Yes   Extension 4   3 Yes   ABduction 4   3 Yes   ADduction 4   3 Yes   Horizontal ABduction           Horizontal ADduction           Internal Rotation 0° 4   3 Yes   Internal Rotation 90°           External Rotation 0° 4   3 Yes   External Rotation 90°               Elbow Strength   Right Strength Right Pain Left Strength Left  Pain   Flexion (C6) 4   3 Yes   Extension (C7) 4   3 Yes      Wrist Strength - Planes of Motion   Right Strength Right Pain Left Strength Left  Pain   Flexion 4   3 Yes   Extension 4   3 Yes   Radial Deviation 4   3 Yes   Ulnar Deviation (C8) 4   3 Yes        Cervical Screen  Tests  Positive: Left Spurling's A  Negative: Right Spurling's A  Cervical Range of Motion      Cervical/Thoracic Special Tests     Cervical Tests  Positive: Left Cervical Compression and Left Spurling's A  Negative: Right Cervical Compression and Right Spurling's A     Treatment:  Manual Therapy  MT 1: STM to (L) cervical paraspinal muscles, (L) upper trap  MT 2: gentle mobilizations to (L) scapula in all directions  MT 3: gentle cervical distraction to patient tolerance  MT 4: manual upper trap stretching as well as levator stretching      Time Entry(in minutes):  PT Evaluation (Moderate) Time Entry: 30  Manual Therapy Time Entry: 15    Assessment & Plan   Assessment  Kisha presents with a condition of Moderate complexity.   Presentation of Symptoms: Evolving  Will Comorbidities Impact Care: No       Functional Limitations:  Activity tolerance, Carrying objects, Completing self-care activities, Disrupted sleep pattern, Driving, Fine motor coordination, Functional mobility, Gross motor coordination, Manipulating objects, Pain when reaching, Pain with ADLs/IADLs, Participating in leisure activities, Performing household chores, Range of motion, Reaching, Sitting tolerance, Standing tolerance  Impairments: Abnormal coordination, Abnormal muscle firing, Abnormal muscle tone, Abnormal or restricted range of motion, Activity intolerance, Impaired physical strength, Lack of appropriate home exercise program, Pain with functional activity  Personal Factors Affecting Prognosis: Pain    Patient Goal for Therapy (PT): TO decrease pain symptoms and return to independent PLOF with decreased pain.  Prognosis: Fair  Assessment Details: Patient may benefit from physical therapist intervention to address previously stated deficits. Patient responded well to manual therapy techniques today with improvement in scapular mobility and decreased pain from 10+ (15) to 7/10. Patient with improvement in postural alignment and overall mobility following manual therapy treatment today.     Plan  From a physical therapy perspective, the patient would benefit from: Skilled Rehab Services    Planned therapy interventions include: Therapeutic exercise, Therapeutic activities, Neuromuscular re-education, and Manual therapy.    Planned modalities to include: Cryotherapy (cold pack), Electrical stimulation - passive/unattended, Mechanical traction, Thermotherapy (hot pack), Ultrasound, and Other (Comment). Dry needling, MFD      Visit Frequency: 2 times Per Week for 6 Weeks.       This plan was discussed with Patient.   Discussion participants: Agreed Upon Plan of Care  Plan details: Certification dates : 6/13/2025-7/25/2025          The patient's spiritual, cultural, and educational needs were considered, and the patient is agreeable to the plan of care and goals.      Education  Education was done with Patient. The patient's learning style includes Demonstration and Listening. The patient Demonstrates understanding and Verbalizes understanding.         Patient educated to continue performing physician directed exercises, especially cervical range of motion in all planes to impact range of motion.        Goals:   Active       Physical Therapy       STG 3 weeks:     1. Patient demonstrates independence with home exercise program and progression.   2. Patient demonstrates postural awareness with all functional activities.   3. Patient demonstrates decreased complaints of pain symptoms to 5/10 at worse.   4. Patient reports decreased dropping of objects with functional activities.     LTG 6 weeks:     1. Patient demonstrates improvement in overall functional mobility evidenced by improvement in FOTO score 80 or greater.   2. Patient demonstrates improvement in upper extremity strength by at least 1/2 grade or greater.   3. Patient demonstrates improvement in cervical range of motion to WNL to impact functional mobility.   4. Patient demonstrates ability to carry 10# to simulate carrying household items.   5. Patient reports decreased radicular pain symptoms with decreased pain 2/10 or less.   6. Patient able to perform Upper extremity range of motion with good quality of movement.   7. Goals PRN.       Physical Therapy Goal       Start:  06/13/25    Expected End:  07/25/25       STG 3 weeks:     1. Patient demonstrates independence with home exercise program and progression.   2. Patient demonstrates postural awareness with all functional activities.   3. Patient demonstrates decreased complaints of pain symptoms to 5/10 at worse.   4. Patient reports decreased dropping of objects with functional activities.     LTG 6 weeks:     1. Patient demonstrates improvement in overall functional mobility evidenced by improvement in FOTO score 80 or greater.   2. Patient demonstrates  improvement in upper extremity strength by at least 1/2 grade or greater.   3. Patient demonstrates improvement in cervical range of motion to WNL to impact functional mobility.   4. Patient demonstrates ability to carry 10# to simulate carrying household items.   5. Patient reports decreased radicular pain symptoms with decreased pain 2/10 or less.   6. Patient able to perform Upper extremity range of motion with good quality of movement.   7. Goals PRN.              Sheri Cortes, PT, MPT  6/13/2025

## 2025-06-16 ENCOUNTER — TELEPHONE (OUTPATIENT)
Dept: ORTHOPEDICS | Facility: CLINIC | Age: 55
End: 2025-06-16
Payer: MEDICAID

## 2025-06-16 NOTE — TELEPHONE ENCOUNTER
Called [facility] to check on referral sent for pt on 4/29/25, office states that they have made multiple attempts to contact the pt for scheduling with no answer or return calls from the pt.

## 2025-06-17 ENCOUNTER — CLINICAL SUPPORT (OUTPATIENT)
Facility: HOSPITAL | Age: 55
End: 2025-06-17
Payer: MEDICAID

## 2025-06-17 DIAGNOSIS — M62.9 MUSCULOSKELETAL DISORDER INVOLVING UPPER TRAPEZIUS MUSCLE: Primary | ICD-10-CM

## 2025-06-17 DIAGNOSIS — G89.29 CHRONIC NECK PAIN: ICD-10-CM

## 2025-06-17 DIAGNOSIS — M54.2 CHRONIC NECK PAIN: ICD-10-CM

## 2025-06-17 PROCEDURE — 97110 THERAPEUTIC EXERCISES: CPT | Mod: CQ

## 2025-06-17 NOTE — PROGRESS NOTES
Outpatient Rehab    Physical Therapy Visit    Patient Name: Kisha Ladd  MRN: [id number]  YOB: 1970  Encounter Date: 6/17/2025    Therapy Diagnosis:   Encounter Diagnoses   Name Primary?    Musculoskeletal disorder involving upper trapezius muscle- left sided Yes    Chronic neck pain      Physician: Guillermo Horton MD    Physician Orders: Eval and Treat  Medical Diagnosis: Musculoskeletal disorder involving upper trapezius muscle  Chronic neck pain  Arthropathy of cervical facet joint  Degeneration of intervertebral disc of cervical region with osteophyte of cervical vertebra  Surgical Diagnosis: Not applicable for this Episode   Surgical Date: Not applicable for this Episode  Days Since Last Surgery: Not applicable for this Episode    Visit # / Visits Authorized:  1 / 12  Insurance Authorization Period: 6/13/2025 to 8/16/2025  Date of Evaluation: 6/13/2025  Plan of Care Certification: 6/13/2025 to 7/25/2025      PT/PTA: PTA   Number of PTA visits since last PT visit:1  Time In: 0801   Time Out: 0857  Total Time (in minutes): 56   Total Billable Time (in minutes):      FOTO:  Intake Score:  %  Survey Score 2:  %  Survey Score 3:  %    Precautions:       Subjective   Patient states she is sore in her L scapular region. She reports she got the MRI orders and is waiting to get it scheduled..  Pain reported as 5/10.      Objective            Treatment:  Therapeutic Exercise  TE 1: Cervical ROM seated rotation 3x 10  TE 2: Cervical ROM supine flexion/extension Pain with flexion  2x10  TE 3: towel stretch 2x2'  TE 4: Rows Green 3x10  TE 5: SAPD Red 3x10  Manual Therapy  MT 1: STM to (L) cervical paraspinal muscles, (L) upper trap  MT 2: gentle mobilizations to (L) scapula in all directions      Time Entry(in minutes):  Manual Therapy Time Entry: 18  Therapeutic Exercise Time Entry: 38    Assessment & Plan   Assessment: Patient with good tolerance with added rows and straight arm pull downs to  improve upright posture and lower trap/lat activation. Patient with decreased symptoms in L side of neck and scapula following manual technique.   Evaluation/Treatment Tolerance: Patient tolerated treatment well    The patient will continue to benefit from skilled outpatient physical therapy in order to address the deficits listed in the problem list on the initial evaluation, provide patient and family education, and maximize the patients level of independence in the home and community environments.     The patient's spiritual, cultural, and educational needs were considered, and the patient is agreeable to the plan of care and goals.     Education  Education was done with Patient. The patient's learning style includes Demonstration and Listening. The patient Demonstrates understanding and Verbalizes understanding.                 Plan: Planned therapy interventions include: Therapeutic exercise, Therapeutic activities, Neuromuscular re-education, and Manual therapy.    Planned modalities to include: Cryotherapy (cold pack), Electrical stimulation - passive/unattended, Mechanical traction, Thermotherapy (hot pack), Ultrasound, and Other (Comment). Dry needling, MFD  Visit Frequency: 2 times Per Week for 6 Weeks.    Goals:   Active       Physical Therapy       STG 3 weeks:     1. Patient demonstrates independence with home exercise program and progression.   2. Patient demonstrates postural awareness with all functional activities.   3. Patient demonstrates decreased complaints of pain symptoms to 5/10 at worse.   4. Patient reports decreased dropping of objects with functional activities.     LTG 6 weeks:     1. Patient demonstrates improvement in overall functional mobility evidenced by improvement in FOTO score 80 or greater.   2. Patient demonstrates improvement in upper extremity strength by at least 1/2 grade or greater.   3. Patient demonstrates improvement in cervical range of motion to WNL to impact  functional mobility.   4. Patient demonstrates ability to carry 10# to simulate carrying household items.   5. Patient reports decreased radicular pain symptoms with decreased pain 2/10 or less.   6. Patient able to perform Upper extremity range of motion with good quality of movement.   7. Goals PRN.       Physical Therapy Goal (Progressing)       Start:  06/13/25    Expected End:  07/25/25       STG 3 weeks:     1. Patient demonstrates independence with home exercise program and progression.   2. Patient demonstrates postural awareness with all functional activities.   3. Patient demonstrates decreased complaints of pain symptoms to 5/10 at worse.   4. Patient reports decreased dropping of objects with functional activities.     LTG 6 weeks:     1. Patient demonstrates improvement in overall functional mobility evidenced by improvement in FOTO score 80 or greater.   2. Patient demonstrates improvement in upper extremity strength by at least 1/2 grade or greater.   3. Patient demonstrates improvement in cervical range of motion to WNL to impact functional mobility.   4. Patient demonstrates ability to carry 10# to simulate carrying household items.   5. Patient reports decreased radicular pain symptoms with decreased pain 2/10 or less.   6. Patient able to perform Upper extremity range of motion with good quality of movement.   7. Goals PRN.              Yokasta Wilson, PTA

## 2025-06-18 ENCOUNTER — HOSPITAL ENCOUNTER (OUTPATIENT)
Dept: RADIOLOGY | Facility: HOSPITAL | Age: 55
Discharge: HOME | End: 2025-06-18
Attending: ANESTHESIOLOGY
Payer: MEDICAID

## 2025-06-18 DIAGNOSIS — M50.30 DEGENERATION OF INTERVERTEBRAL DISC OF CERVICAL REGION WITH OSTEOPHYTE OF CERVICAL VERTEBRA: ICD-10-CM

## 2025-06-18 DIAGNOSIS — M47.812 ARTHROPATHY OF CERVICAL FACET JOINT: ICD-10-CM

## 2025-06-18 DIAGNOSIS — M25.78 DEGENERATION OF INTERVERTEBRAL DISC OF CERVICAL REGION WITH OSTEOPHYTE OF CERVICAL VERTEBRA: ICD-10-CM

## 2025-06-18 DIAGNOSIS — M54.2 CHRONIC NECK PAIN: ICD-10-CM

## 2025-06-18 DIAGNOSIS — M62.9 MUSCULOSKELETAL DISORDER INVOLVING UPPER TRAPEZIUS MUSCLE: ICD-10-CM

## 2025-06-18 DIAGNOSIS — G89.29 CHRONIC NECK PAIN: ICD-10-CM

## 2025-06-18 PROCEDURE — 72141 MRI NECK SPINE W/O DYE: CPT | Mod: TC

## 2025-06-19 ENCOUNTER — CLINICAL SUPPORT (OUTPATIENT)
Facility: HOSPITAL | Age: 55
End: 2025-06-19
Payer: MEDICAID

## 2025-06-19 DIAGNOSIS — M50.30 DEGENERATION OF INTERVERTEBRAL DISC OF CERVICAL REGION WITH OSTEOPHYTE OF CERVICAL VERTEBRA: Primary | ICD-10-CM

## 2025-06-19 DIAGNOSIS — M62.9 MUSCULOSKELETAL DISORDER INVOLVING UPPER TRAPEZIUS MUSCLE: ICD-10-CM

## 2025-06-19 DIAGNOSIS — M25.78 DEGENERATION OF INTERVERTEBRAL DISC OF CERVICAL REGION WITH OSTEOPHYTE OF CERVICAL VERTEBRA: Primary | ICD-10-CM

## 2025-06-19 PROCEDURE — 97110 THERAPEUTIC EXERCISES: CPT | Mod: CQ

## 2025-06-19 NOTE — PROGRESS NOTES
Outpatient Rehab    Physical Therapy Visit    Patient Name: Kisha Ladd  MRN: [id number]  YOB: 1970  Encounter Date: 6/19/2025    Therapy Diagnosis:   Encounter Diagnoses   Name Primary?    Degeneration of intervertebral disc of cervical region with osteophyte of cervical vertebra Yes    Musculoskeletal disorder involving upper trapezius muscle- left sided      Physician: Guillermo Horton MD    Physician Orders: Eval and Treat  Medical Diagnosis: Musculoskeletal disorder involving upper trapezius muscle  Chronic neck pain  Arthropathy of cervical facet joint  Degeneration of intervertebral disc of cervical region with osteophyte of cervical vertebra  Surgical Diagnosis: Not applicable for this Episode   Surgical Date: Not applicable for this Episode  Days Since Last Surgery: Not applicable for this Episode    Visit # / Visits Authorized:  2 / 12  Insurance Authorization Period: 6/13/2025 to 8/16/2025  Date of Evaluation: 6/13/2025  Plan of Care Certification: 6/13/2025 to 7/25/2025      PT/PTA: PTA   Number of PTA visits since last PT visit:2  Time In: 0800   Time Out: 0853  Total Time (in minutes): 53   Total Billable Time (in minutes):      FOTO:  Intake Score:  %  Survey Score 2:  %  Survey Score 3:  %    Precautions:       Subjective   Patient states she walked to therapy this am and drug a felix tree that someone was getting rid of. She had her MRI yesterday..  Pain reported as 5/10.      Objective            Treatment:  Therapeutic Exercise  TE 1: Cervical ROM seated rotation 3x 10  TE 2: Cervical ROM flexion/extension 3x10  TE 3: towel stretch 2x2'  TE 4: Rows Green 3x10  TE 5: SAPD Red 3x10  TE 6: ball on wall cervical ROM side to side up and down  Manual Therapy  MT 1: STM to (L) cervical paraspinal muscles, (L) upper trap  MT 2: gentle mobilizations to (L) scapula in all directions      Time Entry(in minutes):  Manual Therapy Time Entry: 15  Therapeutic Exercise Time Entry:  38    Assessment & Plan   Assessment: Patient able to tolerate seated cervical flexion and extension with decrease symptoms and added cervical motions with ball on wall to improve cervical ROM and mobility.   Evaluation/Treatment Tolerance: Patient tolerated treatment well    The patient will continue to benefit from skilled outpatient physical therapy in order to address the deficits listed in the problem list on the initial evaluation, provide patient and family education, and maximize the patients level of independence in the home and community environments.     The patient's spiritual, cultural, and educational needs were considered, and the patient is agreeable to the plan of care and goals.     Education  Education was done with Patient. The patient's learning style includes Demonstration and Listening. The patient Demonstrates understanding and Verbalizes understanding.                 Plan: Planned therapy interventions include: Therapeutic exercise, Therapeutic activities, Neuromuscular re-education, and Manual therapy.    Planned modalities to include: Cryotherapy (cold pack), Electrical stimulation - passive/unattended, Mechanical traction, Thermotherapy (hot pack), Ultrasound, and Other (Comment). Dry needling, MFD  Visit Frequency: 2 times Per Week for 6 Weeks.    Goals:   Active       Physical Therapy       STG 3 weeks:     1. Patient demonstrates independence with home exercise program and progression.   2. Patient demonstrates postural awareness with all functional activities.   3. Patient demonstrates decreased complaints of pain symptoms to 5/10 at worse.   4. Patient reports decreased dropping of objects with functional activities.     LTG 6 weeks:     1. Patient demonstrates improvement in overall functional mobility evidenced by improvement in FOTO score 80 or greater.   2. Patient demonstrates improvement in upper extremity strength by at least 1/2 grade or greater.   3. Patient demonstrates  improvement in cervical range of motion to WNL to impact functional mobility.   4. Patient demonstrates ability to carry 10# to simulate carrying household items.   5. Patient reports decreased radicular pain symptoms with decreased pain 2/10 or less.   6. Patient able to perform Upper extremity range of motion with good quality of movement.   7. Goals PRN.       Physical Therapy Goal (Progressing)       Start:  06/13/25    Expected End:  07/25/25       STG 3 weeks:     1. Patient demonstrates independence with home exercise program and progression.   2. Patient demonstrates postural awareness with all functional activities.   3. Patient demonstrates decreased complaints of pain symptoms to 5/10 at worse.   4. Patient reports decreased dropping of objects with functional activities.     LTG 6 weeks:     1. Patient demonstrates improvement in overall functional mobility evidenced by improvement in FOTO score 80 or greater.   2. Patient demonstrates improvement in upper extremity strength by at least 1/2 grade or greater.   3. Patient demonstrates improvement in cervical range of motion to WNL to impact functional mobility.   4. Patient demonstrates ability to carry 10# to simulate carrying household items.   5. Patient reports decreased radicular pain symptoms with decreased pain 2/10 or less.   6. Patient able to perform Upper extremity range of motion with good quality of movement.   7. Goals PRN.              Yokasta Wilson, PTA

## 2025-06-26 ENCOUNTER — CLINICAL SUPPORT (OUTPATIENT)
Facility: HOSPITAL | Age: 55
End: 2025-06-26
Payer: MEDICAID

## 2025-06-26 DIAGNOSIS — M54.2 CHRONIC NECK PAIN: ICD-10-CM

## 2025-06-26 DIAGNOSIS — M47.812 ARTHROPATHY OF CERVICAL FACET JOINT: ICD-10-CM

## 2025-06-26 DIAGNOSIS — M25.78 DEGENERATION OF INTERVERTEBRAL DISC OF CERVICAL REGION WITH OSTEOPHYTE OF CERVICAL VERTEBRA: Primary | ICD-10-CM

## 2025-06-26 DIAGNOSIS — G89.29 CHRONIC NECK PAIN: ICD-10-CM

## 2025-06-26 DIAGNOSIS — M62.9 MUSCULOSKELETAL DISORDER INVOLVING UPPER TRAPEZIUS MUSCLE: ICD-10-CM

## 2025-06-26 DIAGNOSIS — M50.30 DEGENERATION OF INTERVERTEBRAL DISC OF CERVICAL REGION WITH OSTEOPHYTE OF CERVICAL VERTEBRA: Primary | ICD-10-CM

## 2025-06-26 PROCEDURE — 97110 THERAPEUTIC EXERCISES: CPT

## 2025-06-26 NOTE — PROGRESS NOTES
Outpatient Rehab    Physical Therapy Visit      PT-PTA POC MEETING  [x] I certify that face-to-face meeting with PTA   regarding this patient's future POC occurred.  PT: Sheri Cortes, PT, MPT   Date:6/26/2025    [x] I certify that face-to-face meeting with PT   regarding this patient's future POC occurred.  PTA:Yokasta Wilson PTA  Date:6/26/2025         Patient Name: Kisha Ladd  MRN: [id number]  YOB: 1970  Encounter Date: 6/26/2025    Therapy Diagnosis:   Encounter Diagnoses   Name Primary?    Degeneration of intervertebral disc of cervical region with osteophyte of cervical vertebra Yes    Arthropathy of cervical facet joint     Chronic neck pain     Musculoskeletal disorder involving upper trapezius muscle- left sided      Physician: Guillermo Horton MD    Physician Orders: Eval and Treat  Medical Diagnosis: Musculoskeletal disorder involving upper trapezius muscle  Chronic neck pain  Arthropathy of cervical facet joint  Degeneration of intervertebral disc of cervical region with osteophyte of cervical vertebra  Surgical Diagnosis: Not applicable for this Episode   Surgical Date: Not applicable for this Episode  Days Since Last Surgery: Not applicable for this Episode    Visit # / Visits Authorized:  3 / 12  Insurance Authorization Period: 6/13/2025 to 8/16/2025  Date of Evaluation: 6/13/2025  Plan of Care Certification: 6/13/2025 to 7/25/2025      PT/PTA: PT   Number of PTA visits since last PT visit:0  Time In: 0800   Time Out: 0859  Total Time (in minutes): 59     Subjective   Patient reports soreness neck and (L) upper extremity. Improvement in functional mobility..         Objective            Treatment:  Therapeutic Exercise  TE 1: Cervical ROM seated rotation 3x 10  TE 2: Cervical ROM flexion/extension 3x10  TE 3: cervical ROM lateral flexion 3x10  TE 4: Rows Green 3x10  TE 5: SAPD green 3x10  TE 6: arm bike reverse x6 minutes  Manual Therapy  MT 1: STM to (L) cervical  paraspinal muscles, (L) upper trap and levator  MT 2: gentle mobilizations to (L) scapula in all directions  MT 3: suboccipital release  MT 4: gentle cervical distraction performed  Modalities  Moist Heat (min): MHP x10 minutes to impact mobility of tissue prior to manual therapy.    Time Entry(in minutes):  Hot/Cold Pack Time Entry: 10 (to neck and upper back to improve tissue mobility.)    Assessment & Plan   Assessment: Good response to therapy treatment with patient with decreased pain and improvement in mobility following treatment. Patient reports compliance with home exercise program. Patient reports overall improvement in functional status since beginning therapy treatment. Continue with current POC.        The patient will continue to benefit from skilled outpatient physical therapy in order to address the deficits listed in the problem list on the initial evaluation, provide patient and family education, and maximize the patients level of independence in the home and community environments.     The patient's spiritual, cultural, and educational needs were considered, and the patient is agreeable to the plan of care and goals.           Plan: Planned therapy interventions include: Therapeutic exercise, Therapeutic activities, Neuromuscular re-education, and Manual therapy.    Planned modalities to include: Cryotherapy (cold pack), Electrical stimulation - passive/unattended, Mechanical traction, Thermotherapy (hot pack), Ultrasound, and Other (Comment). Dry needling, MFD  Visit Frequency: 2 times Per Week for 6 Weeks.    Goals:   Active       Physical Therapy       STG 3 weeks:     1. Patient demonstrates independence with home exercise program and progression.   2. Patient demonstrates postural awareness with all functional activities.   3. Patient demonstrates decreased complaints of pain symptoms to 5/10 at worse.   4. Patient reports decreased dropping of objects with functional activities.     LTG 6  weeks:     1. Patient demonstrates improvement in overall functional mobility evidenced by improvement in FOTO score 80 or greater.   2. Patient demonstrates improvement in upper extremity strength by at least 1/2 grade or greater.   3. Patient demonstrates improvement in cervical range of motion to WNL to impact functional mobility.   4. Patient demonstrates ability to carry 10# to simulate carrying household items.   5. Patient reports decreased radicular pain symptoms with decreased pain 2/10 or less.   6. Patient able to perform Upper extremity range of motion with good quality of movement.   7. Goals PRN.       Physical Therapy Goal (Progressing)       Start:  06/13/25    Expected End:  07/25/25       STG 3 weeks:     1. Patient demonstrates independence with home exercise program and progression.   2. Patient demonstrates postural awareness with all functional activities.   3. Patient demonstrates decreased complaints of pain symptoms to 5/10 at worse.   4. Patient reports decreased dropping of objects with functional activities.     LTG 6 weeks:     1. Patient demonstrates improvement in overall functional mobility evidenced by improvement in FOTO score 80 or greater.   2. Patient demonstrates improvement in upper extremity strength by at least 1/2 grade or greater.   3. Patient demonstrates improvement in cervical range of motion to WNL to impact functional mobility.   4. Patient demonstrates ability to carry 10# to simulate carrying household items.   5. Patient reports decreased radicular pain symptoms with decreased pain 2/10 or less.   6. Patient able to perform Upper extremity range of motion with good quality of movement.   7. Goals PRN.              Sheri Cortes, PT, MPT

## 2025-07-01 ENCOUNTER — CLINICAL SUPPORT (OUTPATIENT)
Facility: HOSPITAL | Age: 55
End: 2025-07-01
Payer: MEDICAID

## 2025-07-01 DIAGNOSIS — M54.2 CHRONIC NECK PAIN: ICD-10-CM

## 2025-07-01 DIAGNOSIS — R53.81 DEBILITY: ICD-10-CM

## 2025-07-01 DIAGNOSIS — M62.9 MUSCULOSKELETAL DISORDER INVOLVING UPPER TRAPEZIUS MUSCLE: Primary | ICD-10-CM

## 2025-07-01 DIAGNOSIS — G89.29 CHRONIC NECK PAIN: ICD-10-CM

## 2025-07-01 PROCEDURE — 97110 THERAPEUTIC EXERCISES: CPT | Mod: CQ

## 2025-07-01 NOTE — PROGRESS NOTES
Outpatient Rehab    Physical Therapy Visit    Patient Name: Kisha Ladd  MRN: [id number]  YOB: 1970  Encounter Date: 7/1/2025    Therapy Diagnosis:   Encounter Diagnoses   Name Primary?    Musculoskeletal disorder involving upper trapezius muscle- left sided Yes    Chronic neck pain     Debility      Physician: Guillermo Horton MD    Physician Orders: Eval and Treat  Medical Diagnosis: Musculoskeletal disorder involving upper trapezius muscle  Chronic neck pain  Arthropathy of cervical facet joint  Degeneration of intervertebral disc of cervical region with osteophyte of cervical vertebra  Surgical Diagnosis: Not applicable for this Episode   Surgical Date: Not applicable for this Episode  Days Since Last Surgery: Not applicable for this Episode    Visit # / Visits Authorized:  4 / 12  Insurance Authorization Period: 6/13/2025 to 8/16/2025  Date of Evaluation: 6/13/2025  Plan of Care Certification: 6/13/2025 to 7/25/2025      PT/PTA: PTA   Number of PTA visits since last PT visit:1  Time In: 0800   Time Out: 0855  Total Time (in minutes): 55   Total Billable Time (in minutes):      FOTO:  Intake Score:  %  Survey Score 2:  %  Survey Score 3:  %    Precautions:       Subjective   Patient states her L shoulder is hurting this am..  Pain reported as 8/10.      Objective            Treatment:  Therapeutic Exercise  TE 1: Cervical ROM seated rotation 3x 10  TE 2: Cervical ROM flexion/extension 3x10  TE 3: Shurgs 3 x10  TE 4: Rows Green 3x12  TE 5: SAPD green 3x10  TE 6: arm bike 6 min Forward  Manual Therapy  MT 1: STM and cupping to (L) cervical paraspinal muscles, (L) upper trap and levator      Time Entry(in minutes):  Manual Therapy Time Entry: 17  Therapeutic Exercise Time Entry: 38    Assessment & Plan   Assessment: Patient with good tolerance with increased reps of therex to improve B UE mobility and strength. Patient with good tolerance with added cupping to improve blood flow to L  shoulder musculature and increase tissue extensibility.   Evaluation/Treatment Tolerance: Patient tolerated treatment well    The patient will continue to benefit from skilled outpatient physical therapy in order to address the deficits listed in the problem list on the initial evaluation, provide patient and family education, and maximize the patients level of independence in the home and community environments.     The patient's spiritual, cultural, and educational needs were considered, and the patient is agreeable to the plan of care and goals.     Education  Education was done with Patient. The patient's learning style includes Demonstration and Listening. The patient Demonstrates understanding and Verbalizes understanding.                 Plan: Planned therapy interventions include: Therapeutic exercise, Therapeutic activities, Neuromuscular re-education, and Manual therapy.    Planned modalities to include: Cryotherapy (cold pack), Electrical stimulation - passive/unattended, Mechanical traction, Thermotherapy (hot pack), Ultrasound, and Other (Comment). Dry needling, MFD  Visit Frequency: 2 times Per Week for 6 Weeks.    Goals:   Active       Physical Therapy       STG 3 weeks:     1. Patient demonstrates independence with home exercise program and progression.   2. Patient demonstrates postural awareness with all functional activities.   3. Patient demonstrates decreased complaints of pain symptoms to 5/10 at worse.   4. Patient reports decreased dropping of objects with functional activities.     LTG 6 weeks:     1. Patient demonstrates improvement in overall functional mobility evidenced by improvement in FOTO score 80 or greater.   2. Patient demonstrates improvement in upper extremity strength by at least 1/2 grade or greater.   3. Patient demonstrates improvement in cervical range of motion to WNL to impact functional mobility.   4. Patient demonstrates ability to carry 10# to simulate carrying  household items.   5. Patient reports decreased radicular pain symptoms with decreased pain 2/10 or less.   6. Patient able to perform Upper extremity range of motion with good quality of movement.   7. Goals PRN.       Physical Therapy Goal (Progressing)       Start:  06/13/25    Expected End:  07/25/25       STG 3 weeks:     1. Patient demonstrates independence with home exercise program and progression.   2. Patient demonstrates postural awareness with all functional activities.   3. Patient demonstrates decreased complaints of pain symptoms to 5/10 at worse.   4. Patient reports decreased dropping of objects with functional activities.     LTG 6 weeks:     1. Patient demonstrates improvement in overall functional mobility evidenced by improvement in FOTO score 80 or greater.   2. Patient demonstrates improvement in upper extremity strength by at least 1/2 grade or greater.   3. Patient demonstrates improvement in cervical range of motion to WNL to impact functional mobility.   4. Patient demonstrates ability to carry 10# to simulate carrying household items.   5. Patient reports decreased radicular pain symptoms with decreased pain 2/10 or less.   6. Patient able to perform Upper extremity range of motion with good quality of movement.   7. Goals PRN.              Yokasta Wilson, EVGENY

## 2025-07-08 ENCOUNTER — CLINICAL SUPPORT (OUTPATIENT)
Facility: HOSPITAL | Age: 55
End: 2025-07-08
Payer: MEDICAID

## 2025-07-08 ENCOUNTER — TELEPHONE (OUTPATIENT)
Dept: PAIN MEDICINE | Facility: CLINIC | Age: 55
End: 2025-07-08
Payer: MEDICAID

## 2025-07-08 ENCOUNTER — OFFICE VISIT (OUTPATIENT)
Dept: PAIN MEDICINE | Facility: CLINIC | Age: 55
End: 2025-07-08
Payer: MEDICAID

## 2025-07-08 VITALS
WEIGHT: 101 LBS | OXYGEN SATURATION: 97 % | BODY MASS INDEX: 19.07 KG/M2 | HEART RATE: 70 BPM | DIASTOLIC BLOOD PRESSURE: 68 MMHG | HEIGHT: 61 IN | SYSTOLIC BLOOD PRESSURE: 101 MMHG

## 2025-07-08 DIAGNOSIS — G89.29 CHRONIC NECK PAIN: ICD-10-CM

## 2025-07-08 DIAGNOSIS — M62.9 MUSCULOSKELETAL DISORDER INVOLVING UPPER TRAPEZIUS MUSCLE: ICD-10-CM

## 2025-07-08 DIAGNOSIS — M54.2 CHRONIC NECK PAIN: ICD-10-CM

## 2025-07-08 DIAGNOSIS — M25.78 DEGENERATION OF INTERVERTEBRAL DISC OF CERVICAL REGION WITH OSTEOPHYTE OF CERVICAL VERTEBRA: ICD-10-CM

## 2025-07-08 DIAGNOSIS — M48.02 CERVICAL STENOSIS OF SPINAL CANAL: ICD-10-CM

## 2025-07-08 DIAGNOSIS — M62.9 MUSCULOSKELETAL DISORDER INVOLVING UPPER TRAPEZIUS MUSCLE: Primary | ICD-10-CM

## 2025-07-08 DIAGNOSIS — M47.812 ARTHROPATHY OF CERVICAL FACET JOINT: ICD-10-CM

## 2025-07-08 DIAGNOSIS — M50.30 DEGENERATION OF INTERVERTEBRAL DISC OF CERVICAL REGION WITH OSTEOPHYTE OF CERVICAL VERTEBRA: ICD-10-CM

## 2025-07-08 DIAGNOSIS — M47.812 ARTHROPATHY OF CERVICAL FACET JOINT: Primary | ICD-10-CM

## 2025-07-08 DIAGNOSIS — M54.12 CERVICAL RADICULITIS: ICD-10-CM

## 2025-07-08 PROCEDURE — 99213 OFFICE O/P EST LOW 20 MIN: CPT | Mod: PBBFAC | Performed by: ANESTHESIOLOGY

## 2025-07-08 PROCEDURE — 3078F DIAST BP <80 MM HG: CPT | Mod: CPTII,,, | Performed by: ANESTHESIOLOGY

## 2025-07-08 PROCEDURE — 3074F SYST BP LT 130 MM HG: CPT | Mod: CPTII,,, | Performed by: ANESTHESIOLOGY

## 2025-07-08 PROCEDURE — 3008F BODY MASS INDEX DOCD: CPT | Mod: CPTII,,, | Performed by: ANESTHESIOLOGY

## 2025-07-08 PROCEDURE — 1159F MED LIST DOCD IN RCRD: CPT | Mod: CPTII,,, | Performed by: ANESTHESIOLOGY

## 2025-07-08 PROCEDURE — 97110 THERAPEUTIC EXERCISES: CPT

## 2025-07-08 PROCEDURE — 99214 OFFICE O/P EST MOD 30 MIN: CPT | Mod: S$PBB,,, | Performed by: ANESTHESIOLOGY

## 2025-07-08 PROCEDURE — 99999 PR PBB SHADOW E&M-EST. PATIENT-LVL III: CPT | Mod: PBBFAC,,, | Performed by: ANESTHESIOLOGY

## 2025-07-08 PROCEDURE — 1160F RVW MEDS BY RX/DR IN RCRD: CPT | Mod: CPTII,,, | Performed by: ANESTHESIOLOGY

## 2025-07-08 RX ORDER — GABAPENTIN 600 MG/1
600 TABLET ORAL 3 TIMES DAILY
Qty: 90 TABLET | Refills: 11 | Status: SHIPPED | OUTPATIENT
Start: 2025-07-08 | End: 2026-07-08

## 2025-07-08 NOTE — PROGRESS NOTES
EST Patient Evaluation  [facility] interventional pain management    Kisha Ladd  : 1970  Date: 2025     CHIEF COMPLAINT:  No chief complaint on file.    Referring Physician: No ref. provider found  Primary Care Physician: Heather Angel NP    HPI:  This is a 54 y.o. female with a chief complaint of No chief complaint on file.  . The patient has Past medical history/Past surgical history of  depression, stroke left sided, subdural hematoma    Patient was evaluated and referred by  González Field NP, orthopedic Surgery for neck and left arm pain  She was chronic opioid therapy hydrocodone 5/325 mg b.I.d. since  from [facility]    She had lumbar radiofrequency ablation before benefit, she is not chronic opioid therapy anymore    Interval History 2025:  Kisha Ladd is here for  follow up visit  discuss MRI cervical spine that showed at C6-C7 moderate bilateral neural foramina narrowing   She was also started on gabapentin, patient reported  Current pain score: ***/10      Diabetic: No    Anticoagulation medications: None    Allergy To Iodine: No    Currently on Antibiotic: No    Pain Disability Index Review:      2025    10:12 AM   Last 3 PDI Scores   Pain Disability Index (PDI) 40       Current Description of Pain Symptoms:    History of Recent Fall or Trauma: No   Onset: Chronic, started on going   Pain Location: neck  Radiates/associated symptoms: LT UE to the hand, numbness in the lateral 3/1/2 fingers, no right upper extremity symptoms  She reported pain and achiness associated with tenderness her left upper extremity  Pain is Getting worse over the last 3 months   The pain is continuous.   The pain is described as aching, burning, numbing, sharp, shooting, stabbing, throbbing, tight band, and tingling.   Exacerbating factors: Sitting, Standing, Laying, Bending, Walking, Lifting, and Getting out of bed/chair.   Mitigating factors N/A.    Symptoms interfere with daily activity, sleeping.   The patient feels like symptoms have been worsening.   Patient denies night fever/night sweats, urinary incontinence, bowel incontinence, significant weight loss, significant motor weakness, and loss of sensations.  Weakness in the left hand related to stroke.    Pain score:   Current: 10/10  Best: 8/10  Worst: 10/10    Current pain medication:  Cymbalta 60mg, QHS    Hydrocodone p.r.n.  Current Narcotics/Opioid /benzo Medications:  Opioids- None  Benzodiazepines: No    UDS:  NA    PDMP:  Reviewed and consistent with medication use as prescribed.     Previous Chronic Pain Treatment History:  Six weeks of conservative therapy include: Physical Therapy/HEP/Physician Lead Exercise Program:  Over the past 12 months, Patient has done  16 sessions.  PT response: Mildly Helpful.   Dates of the PT sessions: 05/2024.  Is patient actively participating in home exercise program (HEP)/ physician led exercise program in the last 6 months: Yes.    Non-interventional Pain Therapy:  []Chiropractor.   []Acupuncture/Dry needle.  []TENS unit.  [x]Heat/ICE.  []Back Brace.    Medications previously tried:  NSAIDs: Ibuprofen (Advil/Motrin) and Meloxicam (Mobic)  Topical Agent: Yes  TCA/SSRI/SNRI: SNRI: Duloxetine (Cymbalta)   Anti-convulsants: Gabapentin   Muscle Relaxants: Robaxin (methocarbamol )  and Flexeril (Cyclobenzaprine)  Opioids- Hydrocodone with Acetaminophen (Norco).    Interventional Pain Procedures:  March 2024: Previous cervical radiofrequency ablation Bilateral, helped by [facility]    Previous spine/Relevant joint surgery:  N/A  Surgical History:   has a past surgical history that includes Hysterectomy; Gallbladder surgery; tonsilectomy; Oophorectomy; and colonoscopy, with polypectomy using snare (N/A, 5/14/2024).  Medical History:   has a past medical history of Bipolar depression, Colon cancer screening (05/2024), Special screening for malignant  neoplasms, colon (5/14/2024), Stroke (12/10/2022), and Wears dentures.  Family History:  family history includes Alzheimer's disease in her mother; Breast cancer in her paternal aunt; Diabetes in her mother; Heart defect in her mother; Heart disease in her brother and father; Kidney failure in her father; No Known Problems in her brother, daughter, maternal aunt, maternal grandfather, maternal grandmother, maternal uncle, paternal grandfather, paternal grandmother, paternal uncle, sister, sister, and another family member.  Allergies:  Sulfa (sulfonamide antibiotics), Codeine phosphate, Diclofenac, and Bupropion hcl   Social History/SUBSTANCE ABUSE HISTORY:   reports that she has quit smoking. Her smoking use included cigarettes. She has been exposed to tobacco smoke. She has never used smokeless tobacco. She reports that she does not currently use alcohol. She reports current drug use. Drug: Marijuana.  LABS:  CBC  Lab Results   Component Value Date    WBC 7.13 05/13/2024    HGB 12.8 05/13/2024    HCT 38.4 05/13/2024     Coagulation Profile   Lab Results   Component Value Date     05/13/2024       Lab Results   Component Value Date    INR 0.9 05/13/2024     CMP:  BMP  Lab Results   Component Value Date     05/13/2024    K 4.2 05/13/2024     05/13/2024    CO2 28 05/13/2024    BUN 7 05/13/2024    CREATININE 0.7 05/13/2024    CALCIUM 9.9 05/13/2024    ANIONGAP 4 05/13/2024    EGFRNORACEVR >60.0 05/13/2024     Lab Results   Component Value Date    ALT 22 05/13/2024    AST 25 05/13/2024    ALKPHOS 102 05/13/2024    BILITOT 0.2 05/13/2024     HGBA1C:  Lab Results   Component Value Date    HGBA1C 4.9 12/25/2021       ROS:    Review of Systems   GENERAL:  No weight loss, malaise or fevers.  HEENT:   No recent changes in vision or hearing  NECK:  Negative for lumps, no difficulty with swallowing.  RESPIRATORY:  Negative for cough, wheezing or shortness of breath, patient denies any recent  URI.  CARDIOVASCULAR:  Negative for chest pain or palpitations.  GI:  Negative for abdominal discomfort, blood in stools or black stools or change in bowel habits.  MUSCULOSKELETAL:  See HPI.  SKIN:  Negative for lesions, rash, and itching.  PSYCH:  No mood disorder or recent psychosocial stressors.   HEMATOLOGY/LYMPHOLOGY:  See the blood thinner sectioned in HPI.  NEURO:  See HPI  All other reviewed and negative other than HPI.    PHYSICAL EXAM:  VITALS: There were no vitals taken for this visit.  There is no height or weight on file to calculate BMI.  GENERAL: Well appearing, in no acute distress, alert and oriented x3, answers questions appropriately.   PSYCH: Flat affect.  SKIN: Skin color, texture, turgor normal, no rashes or lesions.  HEAD/FACE:  Normocephalic, atraumatic. Cranial nerves grossly intact.  CV: Regular rate  PULM: No evidence of respiratory difficulty, symmetric chest rise.  GI:  Soft and non-Distended.  NECK: (+++) pain to palpation over the cervical paraspinous muscles. Spurling:Negative. (+) pain with neck flexion, extension, or lateral flexion, Muscle strength in RT UE 5/5 and Left UE 3/5, Right Hand  5/5, Left Hand grip3/5  GAIT:  normal gait    DIAGNOSTIC STUDIES AND MEDICAL RECORDS REVIEW:  I have personally reviewed and interpreted relevant radiology reports and reviewed relevant records from other services in the EMR.   -  MRI cervical spine 06/2025  Straightening the normal lordosis.  Multilevel disc desiccation and loss of disc height, most pronounced C5-6.  Mild anterolisthesis C2-3 and mild retrolisthesis C5-6.     C2-3: No significant disc bulge.     C3-4: Right asymmetric disc-osteophyte with mild narrowing of the anterior CSF space.     C4-5: No significant disc bulge.     C5-6: Retrolisthesis, broad-based disc-osteophyte, and ligament flavum hypertrophy with contact with the anterior cord.  Moderate right and severe left foraminal stenosis.     C6-7: Broad-based  disc-osteophyte and ligament flavum hypertrophy with contact with the anterior cord and moderate bilateral foraminal stenosis.     C7-T1: No significant disc bulge.     No abnormal signal within the cervical cord.  Vertebral body marrow signal unremarkable.  No abnormality within the visualized portions of the posterior fossa.  Benign-appearing 1.7 cm ovoid lesion within the midline upper thoracic subcutaneous soft tissues, probable sebaceous cyst.     Impression:     Multilevel degenerative changes above, most pronounced C5-6 and C6-7.         Clinical Impression:  This is a pleasant 54 y.o. female patient with PMH/PSH of epression, stroke left sided, subdural hematoma/subarachnoid hemorrhage, presenting with neck pain, mainly axial in nature, intermittent left upper extremity radiation associated with the weakness, numbness in her lateral 3-1/2 finger. Left UE pain possible releated to her previous stroke 2022, and central pain syndrome,  MRI cervical spine was significant for moderate bilateral neural foramina narrowing at C6-C7 in addition to severe left neural foramina narrowed at C5-C6    Encounter Diagnosis:  Kisha Ladd is a 54 y.o. female with the following diagnoses based on history, exam, and imaging:  There are no diagnoses linked to this encounter.       Treatment Plan:    Diagnostics/Referrals:  continue with a home exercise    Medications:    NSAIDs: OTC  Topical Agent: Yes  TCA/SSRI/SNRI: SNRI: Venlafaxine (Effexor)   Anti-convulsants:  Start gabapentin  Muscle Relaxants: None  Opioids: None  Patient was educated about the risk and benefit of chronic opioid therapy including dependency, addiction, diversion, and opioid hyperalgesia.    Interventional Therapy:  May consider repeat cervical medial branch block after obtaining procedure records outside [facility]  Sedation: NA.  Anticoagulation medications: None -Clearance to stop Blood thinner: No    Regarding the above interventions, the patient  has been educated regarding the risks (including bleeding, infection, increased pain, nerve damage, or allergic reaction), benefits, and alternatives. The patient states she understands and is eager to proceed.    Physical Rehabilitation: Referral to Physical therapy for Cervical ROM, stretching, strengthening and a home exercise program    Patient Education: Counseled patient regarding the importance of activity modification, I have stressed the importance of physical activity and a home exercise plan to help with pain and improve health.    Follow-up: RTC discuss images.    May consider: ERWIN Horton MD  Anesthesiologist  Interventional Pain Medicine  07/08/2025    Disclaimer:  This note was prepared using voice recognition system and is likely to have sound alike errors that may have been overlooked even after proof reading.  Please call me with any questions.

## 2025-07-08 NOTE — PROGRESS NOTES
EST Patient Evaluation  [facility]    Kisha Ladd  : 1970  Date: 2025     CHIEF COMPLAINT:  Cervical Spine Pain (C-spine), Neck Pain, Mid-back Pain, and Discuss Images     Referring Physician: No ref. provider found  Primary Care Physician: Heather Angel NP    HPI:  This is a 54 y.o. female with a chief complaint of Cervical Spine Pain (C-spine), Neck Pain, Mid-back Pain, and Discuss Images   . The patient has Past medical history/Past surgical history of  depression, stroke left sided, subdural hematoma    Patient was evaluated and referred by  González Field NP, orthopedic Surgery for neck and left arm pain  She was chronic opioid therapy hydrocodone 5/325 mg b.I.d. since  from [facility]  She had lumbar radiofrequency ablation before W good pain relief, she is not chronic opioid therapy anymore    Interval History 2025:  Kisha Ladd is here for  follow up visit  discuss MRI cervical spine that showed at C6-C7 moderate bilateral neural foramina narrowing   She was also started on gabapentin, patient reported  good pain relief from gabapentin mainly in her hand symptoms.  Current pain score: 8/10    Diabetic: No    Anticoagulation medications: None    Allergy To Iodine: No    Currently on Antibiotic: No    Pain Disability Index Review:      2025    10:12 AM   Last 3 PDI Scores   Pain Disability Index (PDI) 40       Current Description of Pain Symptoms:    History of Recent Fall or Trauma: No   Onset: Chronic, started on going   Pain Location: neck, left sided upper trapezius   Radiates/associated symptoms: LT UE to the hand, numbness in the lateral 3/1/2 fingers, no right upper extremity symptoms  She reported pain and achiness associated with tenderness her left upper extremity  Pain is Getting worse over the last 3 months   The pain is continuous.   The pain is described as aching, burning, numbing, sharp,  shooting, stabbing, throbbing, tight band, and tingling.   Exacerbating factors: Sitting, Standing, Laying, Bending, Walking, Lifting, and Getting out of bed/chair.   Mitigating factors N/A.   Symptoms interfere with daily activity, sleeping.   The patient feels like symptoms have been worsening.   Patient denies night fever/night sweats, urinary incontinence, bowel incontinence, significant weight loss, significant motor weakness, and loss of sensations.  Weakness in the left hand related to stroke  She is left handed     Pain score:  Best: 8/10  Worst: 10/10    Current pain medication:  Cymbalta 60mg, QHS  Gabapentin 300mg, TID    Current Narcotics/Opioid /benzo Medications:  Opioids- None  Benzodiazepines: No    UDS:  NA    PDMP:  Reviewed and consistent with medication use as prescribed.     Previous Chronic Pain Treatment History:  Six weeks of conservative therapy include: Physical Therapy/HEP/Physician Lead Exercise Program:  Over the past 12 months, Patient has done > 16 sessions.  PT response: Mildly Helpful.   Dates of the PT sessions: 05/2024- till now  Is patient actively participating in home exercise program (HEP)/ physician led exercise program in the last 6 months: Yes.    Non-interventional Pain Therapy:  []Chiropractor.   []Acupuncture/Dry needle.  []TENS unit.  [x]Heat/ICE.  []Back Brace.    Medications previously tried:  NSAIDs: Ibuprofen (Advil/Motrin) and Meloxicam (Mobic)  Topical Agent: Yes  TCA/SSRI/SNRI: SNRI: Duloxetine (Cymbalta)   Anti-convulsants: Gabapentin   Muscle Relaxants: Robaxin (methocarbamol )  and Flexeril (Cyclobenzaprine)  Opioids- Hydrocodone with Acetaminophen (Norco).    Interventional Pain Procedures:  March 2024: Previous cervical radiofrequency ablation Bilateral, helped by [facility]    Previous spine/Relevant joint surgery:  N/A  Surgical History:   has a past surgical history that includes Hysterectomy; Gallbladder surgery; tonsilectomy; Oophorectomy;  and colonoscopy, with polypectomy using snare (N/A, 5/14/2024).  Medical History:   has a past medical history of Bipolar depression, Colon cancer screening (05/2024), Special screening for malignant neoplasms, colon (5/14/2024), Stroke (12/10/2022), and Wears dentures.  Family History:  family history includes Alzheimer's disease in her mother; Breast cancer in her paternal aunt; Diabetes in her mother; Heart defect in her mother; Heart disease in her brother and father; Kidney failure in her father; No Known Problems in her brother, daughter, maternal aunt, maternal grandfather, maternal grandmother, maternal uncle, paternal grandfather, paternal grandmother, paternal uncle, sister, sister, and another family member.  Allergies:  Sulfa (sulfonamide antibiotics), Codeine phosphate, Diclofenac, and Bupropion hcl   Social History/SUBSTANCE ABUSE HISTORY:   reports that she has quit smoking. Her smoking use included cigarettes. She has been exposed to tobacco smoke. She has never used smokeless tobacco. She reports that she does not currently use alcohol. She reports current drug use. Drug: Marijuana.  LABS:  CBC  Lab Results   Component Value Date    WBC 7.13 05/13/2024    HGB 12.8 05/13/2024    HCT 38.4 05/13/2024     Coagulation Profile   Lab Results   Component Value Date     05/13/2024       Lab Results   Component Value Date    INR 0.9 05/13/2024     CMP:  BMP  Lab Results   Component Value Date     05/13/2024    K 4.2 05/13/2024     05/13/2024    CO2 28 05/13/2024    BUN 7 05/13/2024    CREATININE 0.7 05/13/2024    CALCIUM 9.9 05/13/2024    ANIONGAP 4 05/13/2024    EGFRNORACEVR >60.0 05/13/2024     Lab Results   Component Value Date    ALT 22 05/13/2024    AST 25 05/13/2024    ALKPHOS 102 05/13/2024    BILITOT 0.2 05/13/2024     HGBA1C:  Lab Results   Component Value Date    HGBA1C 4.9 12/25/2021       ROS:    Review of Systems   GENERAL:  No weight loss, malaise or fevers.  HEENT:   No  "recent changes in vision or hearing  NECK:  Negative for lumps, no difficulty with swallowing.  RESPIRATORY:  Negative for cough, wheezing or shortness of breath, patient denies any recent URI.  CARDIOVASCULAR:  Negative for chest pain or palpitations.  GI:  Negative for abdominal discomfort, blood in stools or black stools or change in bowel habits.  MUSCULOSKELETAL:  See HPI.  SKIN:  Negative for lesions, rash, and itching.  PSYCH:  No mood disorder or recent psychosocial stressors.   HEMATOLOGY/LYMPHOLOGY:  See the blood thinner sectioned in HPI.  NEURO:  See HPI  All other reviewed and negative other than HPI.    PHYSICAL EXAM:  VITALS: /68 (BP Location: Left arm, Patient Position: Sitting)   Pulse 70   Ht 5' 1" (1.549 m)   Wt 45.8 kg (101 lb)   SpO2 97%   BMI 19.08 kg/m²   Body mass index is 19.08 kg/m².  GENERAL: Well appearing, in no acute distress, alert and oriented x3, answers questions appropriately.   PSYCH: Flat affect.  SKIN: Skin color, texture, turgor normal, no rashes or lesions.  HEAD/FACE:  Normocephalic, atraumatic. Cranial nerves grossly intact.  CV: Regular rate  PULM: No evidence of respiratory difficulty, symmetric chest rise.  GI:  Soft and non-Distended.  NECK: (+++) pain to palpation over the cervical paraspinous muscles. Spurling:Negative. (+) pain with neck flexion, extension, or lateral flexion, Muscle strength in RT UE 5/5 and Left UE 3/5, Right Hand  5/5, Left Hand grip3/5  GAIT:  normal gait    DIAGNOSTIC STUDIES AND MEDICAL RECORDS REVIEW:  I have personally reviewed and interpreted relevant radiology reports and reviewed relevant records from other services in the EMR.   -  MRI cervical spine 06/2025  Straightening the normal lordosis.  Multilevel disc desiccation and loss of disc height, most pronounced C5-6.  Mild anterolisthesis C2-3 and mild retrolisthesis C5-6.     C2-3: No significant disc bulge.     C3-4: Right asymmetric disc-osteophyte with mild narrowing of " the anterior CSF space.     C4-5: No significant disc bulge.     C5-6: Retrolisthesis, broad-based disc-osteophyte, and ligament flavum hypertrophy with contact with the anterior cord.  Moderate right and severe left foraminal stenosis.     C6-7: Broad-based disc-osteophyte and ligament flavum hypertrophy with contact with the anterior cord and moderate bilateral foraminal stenosis.     C7-T1: No significant disc bulge.     No abnormal signal within the cervical cord.  Vertebral body marrow signal unremarkable.  No abnormality within the visualized portions of the posterior fossa.  Benign-appearing 1.7 cm ovoid lesion within the midline upper thoracic subcutaneous soft tissues, probable sebaceous cyst.     Impression:     Multilevel degenerative changes above, most pronounced C5-6 and C6-7.       Clinical Impression:  This is a pleasant 54 y.o. female patient with PMH/PSH of epression, stroke left sided, subdural hematoma/subarachnoid hemorrhage, presenting with neck pain, mainly axial in nature, intermittent left upper extremity radiation associated with the weakness, numbness in her lateral 3-1/2 finger. Left UE pain possible releated to her previous stroke , and central pain syndrome,  MRI cervical spine was significant for moderate bilateral neural foramina narrowing at C6-C7 in addition to severe left neural foramina narrowed at C5-C6    Encounter Diagnosis:  Kisha Ladd is a 54 y.o. female with the following diagnoses based on history, exam, and imagin. Musculoskeletal disorder involving upper trapezius muscle- left sided    2. Chronic neck pain    3. Arthropathy of cervical facet joint    4. 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 Cervical stenosis of spinal canal    6. Cervical radiculitis- left-sided       Treatment Plan:    Diagnostics/Referrals: pending records to review     Medications:    NSAIDs: OTC  Topical Agent:  Yes  TCA/SSRI/SNRI: SNRI: Venlafaxine (Effexor)   Anti-convulsants:  increase gabapentin as it helps her left hand numbness  to 600 mg 3 times a day  Muscle Relaxants: None  Opioids: None      Interventional Therapy:  May consider  cervical epidural steroid injection at C7-T1 to address her left upper extremity radiculitis,  if she has not relief we can consider cervical medial branch block after reviewing records  Sedation:  oral  Anticoagulation medications: None -Clearance to stop Blood thinner: No      Physical Rehabilitation: Referral to Physical therapy for Cervical ROM, stretching, strengthening and a home exercise program    Patient Education: Counseled patient regarding the importance of activity modification, I have stressed the importance of physical activity and a home exercise plan to help with pain and improve health.    Follow-up: RTC NA, Direct procedure order.     May consider:  cervical epidural 1st, then CMBB    Guillermo Horton MD  Anesthesiologist  Interventional Pain Medicine  07/08/2025    Disclaimer:  This note was prepared using voice recognition system and is likely to have sound alike errors that may have been overlooked even after proof reading.  Please call me with any questions.

## 2025-07-08 NOTE — TELEPHONE ENCOUNTER
Unable to LVM to inform patient that she will need to bring in records from [facility]

## 2025-07-08 NOTE — H&P (VIEW-ONLY)
EST Patient Evaluation  [facility]    Kisha Ladd  : 1970  Date: 2025     CHIEF COMPLAINT:  Cervical Spine Pain (C-spine), Neck Pain, Mid-back Pain, and Discuss Images     Referring Physician: No ref. provider found  Primary Care Physician: Heather Angel NP    HPI:  This is a 54 y.o. female with a chief complaint of Cervical Spine Pain (C-spine), Neck Pain, Mid-back Pain, and Discuss Images   . The patient has Past medical history/Past surgical history of  depression, stroke left sided, subdural hematoma    Patient was evaluated and referred by  González Field NP, orthopedic Surgery for neck and left arm pain  She was chronic opioid therapy hydrocodone 5/325 mg b.I.d. since  from [facility]  She had lumbar radiofrequency ablation before [facility] good pain relief, she is not chronic opioid therapy anymore    Interval History 2025:  Kisha Ladd is here for  follow up visit  discuss MRI cervical spine that showed at C6-C7 moderate bilateral neural foramina narrowing   She was also started on gabapentin, patient reported  good pain relief from gabapentin mainly in her hand symptoms.  Current pain score: 8/10    Diabetic: No    Anticoagulation medications: None    Allergy To Iodine: No    Currently on Antibiotic: No    Pain Disability Index Review:      2025    10:12 AM   Last 3 PDI Scores   Pain Disability Index (PDI) 40       Current Description of Pain Symptoms:    History of Recent Fall or Trauma: No   Onset: Chronic, started on going   Pain Location: neck, left sided upper trapezius   Radiates/associated symptoms: LT UE to the hand, numbness in the lateral 3/1/2 fingers, no right upper extremity symptoms  She reported pain and achiness associated with tenderness her left upper extremity  Pain is Getting worse over the last 3 months   The pain is continuous.   The pain is described as aching, burning, numbing, sharp,  shooting, stabbing, throbbing, tight band, and tingling.   Exacerbating factors: Sitting, Standing, Laying, Bending, Walking, Lifting, and Getting out of bed/chair.   Mitigating factors N/A.   Symptoms interfere with daily activity, sleeping.   The patient feels like symptoms have been worsening.   Patient denies night fever/night sweats, urinary incontinence, bowel incontinence, significant weight loss, significant motor weakness, and loss of sensations.  Weakness in the left hand related to stroke  She is left handed     Pain score:  Best: 8/10  Worst: 10/10    Current pain medication:  Cymbalta 60mg, QHS  Gabapentin 300mg, TID    Current Narcotics/Opioid /benzo Medications:  Opioids- None  Benzodiazepines: No    UDS:  NA    PDMP:  Reviewed and consistent with medication use as prescribed.     Previous Chronic Pain Treatment History:  Six weeks of conservative therapy include: Physical Therapy/HEP/Physician Lead Exercise Program:  Over the past 12 months, Patient has done > 16 sessions.  PT response: Mildly Helpful.   Dates of the PT sessions: 05/2024- till now  Is patient actively participating in home exercise program (HEP)/ physician led exercise program in the last 6 months: Yes.    Non-interventional Pain Therapy:  []Chiropractor.   []Acupuncture/Dry needle.  []TENS unit.  [x]Heat/ICE.  []Back Brace.    Medications previously tried:  NSAIDs: Ibuprofen (Advil/Motrin) and Meloxicam (Mobic)  Topical Agent: Yes  TCA/SSRI/SNRI: SNRI: Duloxetine (Cymbalta)   Anti-convulsants: Gabapentin   Muscle Relaxants: Robaxin (methocarbamol )  and Flexeril (Cyclobenzaprine)  Opioids- Hydrocodone with Acetaminophen (Norco).    Interventional Pain Procedures:  March 2024: Previous cervical radiofrequency ablation Bilateral, helped by [facility]    Previous spine/Relevant joint surgery:  N/A  Surgical History:   has a past surgical history that includes Hysterectomy; Gallbladder surgery; tonsilectomy; Oophorectomy;  and colonoscopy, with polypectomy using snare (N/A, 5/14/2024).  Medical History:   has a past medical history of Bipolar depression, Colon cancer screening (05/2024), Special screening for malignant neoplasms, colon (5/14/2024), Stroke (12/10/2022), and Wears dentures.  Family History:  family history includes Alzheimer's disease in her mother; Breast cancer in her paternal aunt; Diabetes in her mother; Heart defect in her mother; Heart disease in her brother and father; Kidney failure in her father; No Known Problems in her brother, daughter, maternal aunt, maternal grandfather, maternal grandmother, maternal uncle, paternal grandfather, paternal grandmother, paternal uncle, sister, sister, and another family member.  Allergies:  Sulfa (sulfonamide antibiotics), Codeine phosphate, Diclofenac, and Bupropion hcl   Social History/SUBSTANCE ABUSE HISTORY:   reports that she has quit smoking. Her smoking use included cigarettes. She has been exposed to tobacco smoke. She has never used smokeless tobacco. She reports that she does not currently use alcohol. She reports current drug use. Drug: Marijuana.  LABS:  CBC  Lab Results   Component Value Date    WBC 7.13 05/13/2024    HGB 12.8 05/13/2024    HCT 38.4 05/13/2024     Coagulation Profile   Lab Results   Component Value Date     05/13/2024       Lab Results   Component Value Date    INR 0.9 05/13/2024     CMP:  BMP  Lab Results   Component Value Date     05/13/2024    K 4.2 05/13/2024     05/13/2024    CO2 28 05/13/2024    BUN 7 05/13/2024    CREATININE 0.7 05/13/2024    CALCIUM 9.9 05/13/2024    ANIONGAP 4 05/13/2024    EGFRNORACEVR >60.0 05/13/2024     Lab Results   Component Value Date    ALT 22 05/13/2024    AST 25 05/13/2024    ALKPHOS 102 05/13/2024    BILITOT 0.2 05/13/2024     HGBA1C:  Lab Results   Component Value Date    HGBA1C 4.9 12/25/2021       ROS:    Review of Systems   GENERAL:  No weight loss, malaise or fevers.  HEENT:   No  "recent changes in vision or hearing  NECK:  Negative for lumps, no difficulty with swallowing.  RESPIRATORY:  Negative for cough, wheezing or shortness of breath, patient denies any recent URI.  CARDIOVASCULAR:  Negative for chest pain or palpitations.  GI:  Negative for abdominal discomfort, blood in stools or black stools or change in bowel habits.  MUSCULOSKELETAL:  See HPI.  SKIN:  Negative for lesions, rash, and itching.  PSYCH:  No mood disorder or recent psychosocial stressors.   HEMATOLOGY/LYMPHOLOGY:  See the blood thinner sectioned in HPI.  NEURO:  See HPI  All other reviewed and negative other than HPI.    PHYSICAL EXAM:  VITALS: /68 (BP Location: Left arm, Patient Position: Sitting)   Pulse 70   Ht 5' 1" (1.549 m)   Wt 45.8 kg (101 lb)   SpO2 97%   BMI 19.08 kg/m²   Body mass index is 19.08 kg/m².  GENERAL: Well appearing, in no acute distress, alert and oriented x3, answers questions appropriately.   PSYCH: Flat affect.  SKIN: Skin color, texture, turgor normal, no rashes or lesions.  HEAD/FACE:  Normocephalic, atraumatic. Cranial nerves grossly intact.  CV: Regular rate  PULM: No evidence of respiratory difficulty, symmetric chest rise.  GI:  Soft and non-Distended.  NECK: (+++) pain to palpation over the cervical paraspinous muscles. Spurling:Negative. (+) pain with neck flexion, extension, or lateral flexion, Muscle strength in RT UE 5/5 and Left UE 3/5, Right Hand  5/5, Left Hand grip3/5  GAIT:  normal gait    DIAGNOSTIC STUDIES AND MEDICAL RECORDS REVIEW:  I have personally reviewed and interpreted relevant radiology reports and reviewed relevant records from other services in the EMR.   -  MRI cervical spine 06/2025  Straightening the normal lordosis.  Multilevel disc desiccation and loss of disc height, most pronounced C5-6.  Mild anterolisthesis C2-3 and mild retrolisthesis C5-6.     C2-3: No significant disc bulge.     C3-4: Right asymmetric disc-osteophyte with mild narrowing of " the anterior CSF space.     C4-5: No significant disc bulge.     C5-6: Retrolisthesis, broad-based disc-osteophyte, and ligament flavum hypertrophy with contact with the anterior cord.  Moderate right and severe left foraminal stenosis.     C6-7: Broad-based disc-osteophyte and ligament flavum hypertrophy with contact with the anterior cord and moderate bilateral foraminal stenosis.     C7-T1: No significant disc bulge.     No abnormal signal within the cervical cord.  Vertebral body marrow signal unremarkable.  No abnormality within the visualized portions of the posterior fossa.  Benign-appearing 1.7 cm ovoid lesion within the midline upper thoracic subcutaneous soft tissues, probable sebaceous cyst.     Impression:     Multilevel degenerative changes above, most pronounced C5-6 and C6-7.       Clinical Impression:  This is a pleasant 54 y.o. female patient with PMH/PSH of epression, stroke left sided, subdural hematoma/subarachnoid hemorrhage, presenting with neck pain, mainly axial in nature, intermittent left upper extremity radiation associated with the weakness, numbness in her lateral 3-1/2 finger. Left UE pain possible releated to her previous stroke , and central pain syndrome,  MRI cervical spine was significant for moderate bilateral neural foramina narrowing at C6-C7 in addition to severe left neural foramina narrowed at C5-C6    Encounter Diagnosis:  Kisha Ladd is a 54 y.o. female with the following diagnoses based on history, exam, and imagin. Musculoskeletal disorder involving upper trapezius muscle- left sided    2. Chronic neck pain    3. Arthropathy of cervical facet joint    4. 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 Cervical stenosis of spinal canal    6. Cervical radiculitis- left-sided       Treatment Plan:    Diagnostics/Referrals: pending records to review     Medications:    NSAIDs: OTC  Topical Agent:  Yes  TCA/SSRI/SNRI: SNRI: Venlafaxine (Effexor)   Anti-convulsants:  increase gabapentin as it helps her left hand numbness  to 600 mg 3 times a day  Muscle Relaxants: None  Opioids: None      Interventional Therapy:  May consider  cervical epidural steroid injection at C7-T1 to address her left upper extremity radiculitis,  if she has not relief we can consider cervical medial branch block after reviewing records  Sedation:  oral  Anticoagulation medications: None -Clearance to stop Blood thinner: No      Physical Rehabilitation: Referral to Physical therapy for Cervical ROM, stretching, strengthening and a home exercise program    Patient Education: Counseled patient regarding the importance of activity modification, I have stressed the importance of physical activity and a home exercise plan to help with pain and improve health.    Follow-up: RTC NA, Direct procedure order.     May consider:  cervical epidural 1st, then CMBB    Guillermo Horton MD  Anesthesiologist  Interventional Pain Medicine  07/08/2025    Disclaimer:  This note was prepared using voice recognition system and is likely to have sound alike errors that may have been overlooked even after proof reading.  Please call me with any questions.

## 2025-07-08 NOTE — PROGRESS NOTES
Outpatient Rehab    Physical Therapy Visit    Patient Name: Kisha Ladd  MRN: [id number]  YOB: 1970  Encounter Date: 7/8/2025    Therapy Diagnosis:   Encounter Diagnoses   Name Primary?    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      Physician: Guillermo Horton MD    Physician Orders: Eval and Treat  Medical Diagnosis: Musculoskeletal disorder involving upper trapezius muscle  Chronic neck pain  Arthropathy of cervical facet joint  Degeneration of intervertebral disc of cervical region with osteophyte of cervical vertebra  Surgical Diagnosis: Not applicable for this Episode   Surgical Date: Not applicable for this Episode  Days Since Last Surgery: Not applicable for this Episode    Visit # / Visits Authorized:  5 / 12  Insurance Authorization Period: 6/13/2025 to 8/16/2025  Date of Evaluation: 6/13/2025  Plan of Care Certification: 6/13/2025 to 7/25/2025      PT/PTA: PT   Number of PTA visits since last PT visit:0  Time In: 0800   Time Out: 0838  Total Time (in minutes): 38      Subjective   Patient has neurology appt today. Patient overall pleased with progress with therapy treatment to this point..         Objective            Treatment:  Therapeutic Exercise  TE 1: Cervical ROM seated rotation 3x 10  TE 2: Cervical ROM flexion/extension 3x10  TE 3: Shrugs 3 x10  TE 4: Rows Green 3x12  TE 5: SAPD green 3x10  TE 6: arm bike 3 min Forward/ 3 min backward  Manual Therapy  MT 1: STM  (B) upper trap and levator with special attention to TrP (R) upper trap      Time Entry(in minutes):  Manual Therapy Time Entry: 15  Therapeutic Exercise Time Entry: 23    Assessment & Plan   Assessment: Good response to therapy treatment today. Patient with TrP (right) upper trap. Patient reports decreased complaints of neck  and upper back discomfort following treatment. Physical therapist will continue to progress per current POC.        The patient will continue to benefit from skilled outpatient physical therapy in order to address the deficits listed in the problem list on the initial evaluation, provide patient and family education, and maximize the patients level of independence in the home and community environments.     The patient's spiritual, cultural, and educational needs were considered, and the patient is agreeable to the plan of care and goals.           Plan: Planned therapy interventions include: Therapeutic exercise, Therapeutic activities, Neuromuscular re-education, and Manual therapy.    Planned modalities to include: Cryotherapy (cold pack), Electrical stimulation - passive/unattended, Mechanical traction, Thermotherapy (hot pack), Ultrasound, and Other (Comment). Dry needling, MFD  Visit Frequency: 2 times Per Week for 6 Weeks.    Goals:   Active       Physical Therapy       STG 3 weeks:     1. Patient demonstrates independence with home exercise program and progression.   2. Patient demonstrates postural awareness with all functional activities.   3. Patient demonstrates decreased complaints of pain symptoms to 5/10 at worse.   4. Patient reports decreased dropping of objects with functional activities.     LTG 6 weeks:     1. Patient demonstrates improvement in overall functional mobility evidenced by improvement in FOTO score 80 or greater.   2. Patient demonstrates improvement in upper extremity strength by at least 1/2 grade or greater.   3. Patient demonstrates improvement in cervical range of motion to WNL to impact functional mobility.   4. Patient demonstrates ability to carry 10# to simulate carrying household items.   5. Patient reports decreased radicular pain symptoms with decreased pain 2/10 or less.   6. Patient able to perform Upper extremity range of motion with good quality of movement.   7.  Goals PRN.       Physical Therapy Goal (Progressing)       Start:  06/13/25    Expected End:  07/25/25       STG 3 weeks:     1. Patient demonstrates independence with home exercise program and progression.   2. Patient demonstrates postural awareness with all functional activities.   3. Patient demonstrates decreased complaints of pain symptoms to 5/10 at worse.   4. Patient reports decreased dropping of objects with functional activities.     LTG 6 weeks:     1. Patient demonstrates improvement in overall functional mobility evidenced by improvement in FOTO score 80 or greater.   2. Patient demonstrates improvement in upper extremity strength by at least 1/2 grade or greater.   3. Patient demonstrates improvement in cervical range of motion to WNL to impact functional mobility.   4. Patient demonstrates ability to carry 10# to simulate carrying household items.   5. Patient reports decreased radicular pain symptoms with decreased pain 2/10 or less.   6. Patient able to perform Upper extremity range of motion with good quality of movement.   7. Goals PRN.              Sheri Cortes, PT, MPT

## 2025-07-10 ENCOUNTER — CLINICAL SUPPORT (OUTPATIENT)
Facility: HOSPITAL | Age: 55
End: 2025-07-10
Payer: MEDICAID

## 2025-07-10 DIAGNOSIS — M62.9 MUSCULOSKELETAL DISORDER INVOLVING UPPER TRAPEZIUS MUSCLE: ICD-10-CM

## 2025-07-10 DIAGNOSIS — M54.2 CHRONIC NECK PAIN: Primary | ICD-10-CM

## 2025-07-10 DIAGNOSIS — G89.29 CHRONIC NECK PAIN: Primary | ICD-10-CM

## 2025-07-10 PROCEDURE — 97110 THERAPEUTIC EXERCISES: CPT | Mod: CQ

## 2025-07-10 NOTE — PROGRESS NOTES
Outpatient Rehab    Physical Therapy Visit    Patient Name: Kisha Ladd  MRN: [id number]  YOB: 1970  Encounter Date: 7/10/2025    Therapy Diagnosis:   Encounter Diagnoses   Name Primary?    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      Physician: Guillermo Horton MD    Physician Orders: Eval and Treat  Medical Diagnosis: Musculoskeletal disorder involving upper trapezius muscle  Chronic neck pain  Arthropathy of cervical facet joint  Degeneration of intervertebral disc of cervical region with osteophyte of cervical vertebra  Surgical Diagnosis: Not applicable for this Episode   Surgical Date: Not applicable for this Episode  Days Since Last Surgery: Not applicable for this Episode    Visit # / Visits Authorized:  6 / 12  Insurance Authorization Period: 6/13/2025 to 8/16/2025  Date of Evaluation: 6/13/2025  Plan of Care Certification: 6/13/2025 to 7/25/2025      PT/PTA: PTA   Number of PTA visits since last PT visit:1  Time In: 0800   Time Out: 0858  Total Time (in minutes): 58   Total Billable Time (in minutes):      FOTO:  Intake Score:  %  Survey Score 2:  %  Survey Score 3:  %    Precautions:       Subjective   Patient states the neurologist didn't have good news for her. States her neck does not have a good curve and she will be getting scheduled for  nerve burning in her neck..  Pain reported as 6/10.      Objective            Treatment:  Therapeutic Exercise  TE 1: Cervical ROM seated rotation 3x 10  TE 2: Cervical ROM flexion/extension 3x10  TE 3: Shrugs 3 x10  TE 4: Rows Green 3x12  TE 5: SAPD green 3x10  TE 6: arm bike 3 min Forward/ 3 min backward  Manual Therapy  MT 1: cupping to (B) upper trap and levator      Time Entry(in minutes):  Manual Therapy Time Entry: 20  Therapeutic Exercise Time Entry: 38    Assessment & Plan   Assessment: Patient with good tolerance to increased reps with selected therex to improve B UE strength  and cervical stability.   Evaluation/Treatment Tolerance: Patient tolerated treatment well    The patient will continue to benefit from skilled outpatient physical therapy in order to address the deficits listed in the problem list on the initial evaluation, provide patient and family education, and maximize the patients level of independence in the home and community environments.     The patient's spiritual, cultural, and educational needs were considered, and the patient is agreeable to the plan of care and goals.     Education  Education was done with Patient. The patient's learning style includes Demonstration and Listening. The patient Demonstrates understanding and Verbalizes understanding.                 Plan: Planned therapy interventions include: Therapeutic exercise, Therapeutic activities, Neuromuscular re-education, and Manual therapy.    Planned modalities to include: Cryotherapy (cold pack), Electrical stimulation - passive/unattended, Mechanical traction, Thermotherapy (hot pack), Ultrasound, and Other (Comment). Dry needling, MFD  Visit Frequency: 2 times Per Week for 6 Weeks.    Goals:   Active       Physical Therapy       STG 3 weeks:     1. Patient demonstrates independence with home exercise program and progression.   2. Patient demonstrates postural awareness with all functional activities.   3. Patient demonstrates decreased complaints of pain symptoms to 5/10 at worse.   4. Patient reports decreased dropping of objects with functional activities.     LTG 6 weeks:     1. Patient demonstrates improvement in overall functional mobility evidenced by improvement in FOTO score 80 or greater.   2. Patient demonstrates improvement in upper extremity strength by at least 1/2 grade or greater.   3. Patient demonstrates improvement in cervical range of motion to WNL to impact functional mobility.   4. Patient demonstrates ability to carry 10# to simulate carrying household items.   5. Patient reports  decreased radicular pain symptoms with decreased pain 2/10 or less.   6. Patient able to perform Upper extremity range of motion with good quality of movement.   7. Goals PRN.       Physical Therapy Goal (Progressing)       Start:  06/13/25    Expected End:  07/25/25       STG 3 weeks:     1. Patient demonstrates independence with home exercise program and progression.   2. Patient demonstrates postural awareness with all functional activities.   3. Patient demonstrates decreased complaints of pain symptoms to 5/10 at worse.   4. Patient reports decreased dropping of objects with functional activities.     LTG 6 weeks:     1. Patient demonstrates improvement in overall functional mobility evidenced by improvement in FOTO score 80 or greater.   2. Patient demonstrates improvement in upper extremity strength by at least 1/2 grade or greater.   3. Patient demonstrates improvement in cervical range of motion to WNL to impact functional mobility.   4. Patient demonstrates ability to carry 10# to simulate carrying household items.   5. Patient reports decreased radicular pain symptoms with decreased pain 2/10 or less.   6. Patient able to perform Upper extremity range of motion with good quality of movement.   7. Goals PRN.              Yokasta Wilson, PTA

## 2025-07-11 ENCOUNTER — TELEPHONE (OUTPATIENT)
Dept: PAIN MEDICINE | Facility: CLINIC | Age: 55
End: 2025-07-11
Payer: MEDICAID

## 2025-07-11 DIAGNOSIS — M54.12 CERVICAL RADICULOPATHY: Primary | ICD-10-CM

## 2025-07-11 NOTE — TELEPHONE ENCOUNTER
----- Message from Guillermo Horton MD sent at 7/10/2025  1:12 PM CDT -----   Please see the patient today I have reviewed her records   Please schedule her for cervical epidural steroid injection C7-T1   Oral sedation   Do 4 weeks follow-up

## 2025-07-15 ENCOUNTER — CLINICAL SUPPORT (OUTPATIENT)
Facility: HOSPITAL | Age: 55
End: 2025-07-15
Payer: MEDICAID

## 2025-07-15 DIAGNOSIS — M54.2 CHRONIC NECK PAIN: Primary | ICD-10-CM

## 2025-07-15 DIAGNOSIS — M62.9 MUSCULOSKELETAL DISORDER INVOLVING UPPER TRAPEZIUS MUSCLE: ICD-10-CM

## 2025-07-15 DIAGNOSIS — R53.81 DEBILITY: ICD-10-CM

## 2025-07-15 DIAGNOSIS — G89.29 CHRONIC NECK PAIN: Primary | ICD-10-CM

## 2025-07-15 PROCEDURE — 97110 THERAPEUTIC EXERCISES: CPT | Mod: CQ

## 2025-07-15 NOTE — PROGRESS NOTES
Outpatient Rehab    Physical Therapy Visit    Patient Name: Kisha Ladd  MRN: [id number]  YOB: 1970  Encounter Date: 7/15/2025    Therapy Diagnosis:   Encounter Diagnoses   Name Primary?    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     Debility      Physician: Guillermo Horton MD    Physician Orders: Eval and Treat  Medical Diagnosis: Musculoskeletal disorder involving upper trapezius muscle  Chronic neck pain  Arthropathy of cervical facet joint  Degeneration of intervertebral disc of cervical region with osteophyte of cervical vertebra  Surgical Diagnosis: Not applicable for this Episode   Surgical Date: Not applicable for this Episode  Days Since Last Surgery: Not applicable for this Episode    Visit # / Visits Authorized:  7 / 12  Insurance Authorization Period: 6/13/2025 to 8/16/2025  Date of Evaluation: 6/13/2025  Plan of Care Certification: 6/13/2025 to 7/25/2025      PT/PTA: PTA   Number of PTA visits since last PT visit:2  Time In: 0800   Time Out: 0855  Total Time (in minutes): 55   Total Billable Time (in minutes):      FOTO:  Intake Score: 17%  Survey Score 2: Not applicable for this Episode%  Survey Score 3: Not applicable for this Episode%    Precautions:         Subjective   Patient states she didn't walk to PT this am due to increased pain in neck. She states she has a nerve block on July 25th..  Pain reported as 8/10.      Objective            Treatment:  Therapeutic Exercise  TE 1: Cervical ROM seated rotation 3x 12  TE 2: Cervical ROM flexion/extension 3x12  TE 3: Shrugs 3 x12  TE 4: Rows 10#  3x10  TE 5: SAPD 10# 3x10  TE 6: arm bike 2 min Forward/ 2 min backward  Manual Therapy  MT 3: suboccipital release  Modalities  Moist Heat (min): MHP x10 minutes to impact mobility of tissue at start of session.    Time Entry(in minutes):  Manual Therapy Time Entry: 17  Therapeutic Exercise Time Entry: 38    Assessment & Plan    Assessment: Patient with good tolerance with increased reps of shrugs and AROM of neck to improve mobility and decrease tightness.   Evaluation/Treatment Tolerance: Patient tolerated treatment well    The patient will continue to benefit from skilled outpatient physical therapy in order to address the deficits listed in the problem list on the initial evaluation, provide patient and family education, and maximize the patients level of independence in the home and community environments.     The patient's spiritual, cultural, and educational needs were considered, and the patient is agreeable to the plan of care and goals.     Education  Education was done with Patient. The patient's learning style includes Demonstration and Listening. The patient Demonstrates understanding and Verbalizes understanding.                 Plan: Planned therapy interventions include: Therapeutic exercise, Therapeutic activities, Neuromuscular re-education, and Manual therapy.    Planned modalities to include: Cryotherapy (cold pack), Electrical stimulation - passive/unattended, Mechanical traction, Thermotherapy (hot pack), Ultrasound, and Other (Comment). Dry needling, MFD  Visit Frequency: 2 times Per Week for 6 Weeks.    Goals:   Active       Physical Therapy       STG 3 weeks:     1. Patient demonstrates independence with home exercise program and progression.   2. Patient demonstrates postural awareness with all functional activities.   3. Patient demonstrates decreased complaints of pain symptoms to 5/10 at worse.   4. Patient reports decreased dropping of objects with functional activities.     LTG 6 weeks:     1. Patient demonstrates improvement in overall functional mobility evidenced by improvement in FOTO score 80 or greater.   2. Patient demonstrates improvement in upper extremity strength by at least 1/2 grade or greater.   3. Patient demonstrates improvement in cervical range of motion to WNL to impact functional  mobility.   4. Patient demonstrates ability to carry 10# to simulate carrying household items.   5. Patient reports decreased radicular pain symptoms with decreased pain 2/10 or less.   6. Patient able to perform Upper extremity range of motion with good quality of movement.   7. Goals PRN.       Physical Therapy Goal (Progressing)       Start:  06/13/25    Expected End:  07/25/25       STG 3 weeks:     1. Patient demonstrates independence with home exercise program and progression.   2. Patient demonstrates postural awareness with all functional activities.   3. Patient demonstrates decreased complaints of pain symptoms to 5/10 at worse.   4. Patient reports decreased dropping of objects with functional activities.     LTG 6 weeks:     1. Patient demonstrates improvement in overall functional mobility evidenced by improvement in FOTO score 80 or greater.   2. Patient demonstrates improvement in upper extremity strength by at least 1/2 grade or greater.   3. Patient demonstrates improvement in cervical range of motion to WNL to impact functional mobility.   4. Patient demonstrates ability to carry 10# to simulate carrying household items.   5. Patient reports decreased radicular pain symptoms with decreased pain 2/10 or less.   6. Patient able to perform Upper extremity range of motion with good quality of movement.   7. Goals PRN.              Yokasta Wilson, PTA

## 2025-07-22 ENCOUNTER — CLINICAL SUPPORT (OUTPATIENT)
Facility: HOSPITAL | Age: 55
End: 2025-07-22
Payer: MEDICAID

## 2025-07-22 DIAGNOSIS — G89.29 CHRONIC NECK PAIN: Primary | ICD-10-CM

## 2025-07-22 DIAGNOSIS — M54.2 CHRONIC NECK PAIN: Primary | ICD-10-CM

## 2025-07-22 PROCEDURE — 97110 THERAPEUTIC EXERCISES: CPT | Mod: CQ

## 2025-07-22 NOTE — PROGRESS NOTES
Outpatient Rehab    Physical Therapy Visit    Patient Name: Kisha Ladd  MRN: [id number]  YOB: 1970  Encounter Date: 7/22/2025    Therapy Diagnosis:   Encounter Diagnosis   Name Primary?    Chronic neck pain Yes     Physician: Guillermo Horton MD    Physician Orders: Eval and Treat  Medical Diagnosis: Musculoskeletal disorder involving upper trapezius muscle  Chronic neck pain  Arthropathy of cervical facet joint  Degeneration of intervertebral disc of cervical region with osteophyte of cervical vertebra  Surgical Diagnosis: Not applicable for this Episode   Surgical Date: Not applicable for this Episode  Days Since Last Surgery: Not applicable for this Episode    Visit # / Visits Authorized:  8 / 12  Insurance Authorization Period: 6/13/2025 to 8/16/2025  Date of Evaluation: 6/13/2025  Plan of Care Certification: 6/13/2025 to 7/25/2025      PT/PTA: PTA   Number of PTA visits since last PT visit:3  Time In: 0800   Time Out: 0855  Total Time (in minutes): 55   Total Billable Time (in minutes):      FOTO:  Intake Score (%): 17  Survey Score 2 (%): Not applicable for this Episode  Survey Score 3 (%): Not applicable for this Episode    Precautions:         Subjective   Patient states she needs heat this am on L shoulder and it is only one spot that is hurting her. Patient states she is having a steriod injection on Friday..  Pain reported as 5/10.      Objective            Treatment:  Therapeutic Exercise  TE 1: Cervical ROM seated rotation 3x 12  TE 2: Cervical ROM flexion/extension 3x12  TE 3: Shrugs 3 x12  TE 4: Rows 10#  3x10  TE 5: SAPD 10# 3x10  TE 6: arm bike 2 min Forward/ 2 min backward  Manual Therapy  MT 3: suboccipital release  Modalities  Moist Heat (min): MHP x10 minutes to impact mobility of tissue at start of session.    Time Entry(in minutes):  Manual Therapy Time Entry: 17  Therapeutic Exercise Time Entry: 38    Assessment & Plan   Assessment: Patient with good tolerance to all  therex to improve B UE strength and L shoulder stability.   Evaluation/Treatment Tolerance: Patient tolerated treatment well    The patient will continue to benefit from skilled outpatient physical therapy in order to address the deficits listed in the problem list on the initial evaluation, provide patient and family education, and maximize the patients level of independence in the home and community environments.     The patient's spiritual, cultural, and educational needs were considered, and the patient is agreeable to the plan of care and goals.     Education  Education was done with Patient. The patient's learning style includes Demonstration and Listening. The patient Demonstrates understanding and Verbalizes understanding.                 Plan: Planned therapy interventions include: Therapeutic exercise, Therapeutic activities, Neuromuscular re-education, and Manual therapy.    Planned modalities to include: Cryotherapy (cold pack), Electrical stimulation - passive/unattended, Mechanical traction, Thermotherapy (hot pack), Ultrasound, and Other (Comment). Dry needling, MFD  Visit Frequency: 2 times Per Week for 6 Weeks.    Goals:   Active       Physical Therapy       STG 3 weeks:     1. Patient demonstrates independence with home exercise program and progression.   2. Patient demonstrates postural awareness with all functional activities.   3. Patient demonstrates decreased complaints of pain symptoms to 5/10 at worse.   4. Patient reports decreased dropping of objects with functional activities.     LTG 6 weeks:     1. Patient demonstrates improvement in overall functional mobility evidenced by improvement in FOTO score 80 or greater.   2. Patient demonstrates improvement in upper extremity strength by at least 1/2 grade or greater.   3. Patient demonstrates improvement in cervical range of motion to WNL to impact functional mobility.   4. Patient demonstrates ability to carry 10# to simulate carrying  household items.   5. Patient reports decreased radicular pain symptoms with decreased pain 2/10 or less.   6. Patient able to perform Upper extremity range of motion with good quality of movement.   7. Goals PRN.       Physical Therapy Goal (Progressing)       Start:  06/13/25    Expected End:  07/25/25       STG 3 weeks:     1. Patient demonstrates independence with home exercise program and progression.   2. Patient demonstrates postural awareness with all functional activities.   3. Patient demonstrates decreased complaints of pain symptoms to 5/10 at worse.   4. Patient reports decreased dropping of objects with functional activities.     LTG 6 weeks:     1. Patient demonstrates improvement in overall functional mobility evidenced by improvement in FOTO score 80 or greater.   2. Patient demonstrates improvement in upper extremity strength by at least 1/2 grade or greater.   3. Patient demonstrates improvement in cervical range of motion to WNL to impact functional mobility.   4. Patient demonstrates ability to carry 10# to simulate carrying household items.   5. Patient reports decreased radicular pain symptoms with decreased pain 2/10 or less.   6. Patient able to perform Upper extremity range of motion with good quality of movement.   7. Goals PRN.              Yokasta Wilson, EVGENY

## 2025-07-24 ENCOUNTER — CLINICAL SUPPORT (OUTPATIENT)
Facility: HOSPITAL | Age: 55
End: 2025-07-24
Payer: MEDICAID

## 2025-07-24 ENCOUNTER — TELEPHONE (OUTPATIENT)
Dept: PREADMISSION TESTING | Facility: HOSPITAL | Age: 55
End: 2025-07-24
Payer: MEDICAID

## 2025-07-24 DIAGNOSIS — M62.9 MUSCULOSKELETAL DISORDER INVOLVING UPPER TRAPEZIUS MUSCLE: ICD-10-CM

## 2025-07-24 DIAGNOSIS — M50.30 DEGENERATION OF INTERVERTEBRAL DISC OF CERVICAL REGION WITH OSTEOPHYTE OF CERVICAL VERTEBRA: ICD-10-CM

## 2025-07-24 DIAGNOSIS — M54.2 CHRONIC NECK PAIN: ICD-10-CM

## 2025-07-24 DIAGNOSIS — G89.29 CHRONIC NECK PAIN: ICD-10-CM

## 2025-07-24 DIAGNOSIS — M47.812 ARTHROPATHY OF CERVICAL FACET JOINT: Primary | ICD-10-CM

## 2025-07-24 DIAGNOSIS — M25.78 DEGENERATION OF INTERVERTEBRAL DISC OF CERVICAL REGION WITH OSTEOPHYTE OF CERVICAL VERTEBRA: ICD-10-CM

## 2025-07-24 PROCEDURE — 97110 THERAPEUTIC EXERCISES: CPT

## 2025-07-24 NOTE — PROGRESS NOTES
Outpatient Rehab    Physical Therapy Progress Note : Updated Plan of Care    Patient Name: Kisha Ladd  MRN: [id number]  YOB: 1970  Encounter Date: 7/24/2025    Therapy Diagnosis:   Encounter Diagnoses   Name Primary?    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      Physician: Guillermo Horton MD    Physician Orders: Eval and Treat  Medical Diagnosis: Musculoskeletal disorder involving upper trapezius muscle  Chronic neck pain  Arthropathy of cervical facet joint  Degeneration of intervertebral disc of cervical region with osteophyte of cervical vertebra  Surgical Diagnosis: Not applicable for this Episode   Surgical Date: Not applicable for this Episode  Days Since Last Surgery: Not applicable for this Episode    Visit # / Visits Authorized:  9 / 12  Insurance Authorization Period: 6/13/2025 to 8/16/2025  Date of Evaluation: 6/13/2025   Current Plan of Care Certification: 6/13/2025 to 7/25/2025   NEW Plan of Care Certification:  7/24/2025 to 8/22/2025   PT/PTA: PT   Number of PTA visits since last PT visit:0  Time In: 0800   Time Out: 0855  Total Time (in minutes): 55   FOTO:  Intake Score (%): 17  Survey Score 2 (%): 46  Survey Score 3 (%): Not applicable for this Episode    Precautions:       Subjective   Patient reports that she is having injections in cervical spine tomorrow and upcoming procedures in neck in the future. Patient reports she wants to continue with therapy intervention because she feels significant positive changes since beginning therapy intervention but continues to have some lingering pain and deficits. Patient reports currently over past few sessions not feeling numbness or tingling in (L) UE. Patient reports she continue to have (L) side cervical/upper trap discomfort especially with  (R) lateral flexion and rotations. Current pain symptoms prior to treatment 5.5/10..         Objective          Cervical Range of Motion    Active (deg) Passive (deg) Pain   Flexion 48      Extension 52      Right Lateral Flexion 45   soreness   Right Rotation 65   soreness   Left Lateral Flexion 45      Left Rotation 65               Shoulder Range of Motion     Shoulder, Elbow, or Forearm Range of Motion Details: WFL and improved quality of movement (bilateral) upper extremity and some discomfort with elbow flexion/extension resisted.      Shoulder Strength - Planes of Motion    Right Strength Right Pain Left Strength Left  Pain   Flexion 4   3+    Extension 4   3+    ABduction 4   3+    ADduction 4   3+    Horizontal ABduction          Horizontal ADduction          Internal Rotation 0° 4   3+    Internal Rotation 90°          External Rotation 0° 4   3+    External Rotation 90°                 Elbow Strength    Right Strength Right Pain Left Strength Left  Pain   Flexion (C6) 4   3+ soreness   Extension (C7) 4   3 soreness      Wrist Strength - Planes of Motion    Right Strength Right Pain Left Strength Left  Pain   Flexion 4   3+    Extension 4   3+    Radial Deviation 4   3+    Ulnar Deviation (C8) 4   3+          Treatment:  Therapeutic Exercise  TE 1: Cervical ROM seated rotation 3x 12  TE 2: Cervical ROM flexion/extension 3x12  TE 3: Shrugs 3 x12  TE 4: scapular retractions no resistance 3x10  TE 6: arm bike 4 min Forward/ 4 min backward  Manual Therapy  MT 2: STM to neck and upper trap/levator with manual stretching also to these structures.  MT 3: suboccipital release  Modalities  Moist Heat (min): MHP x10 minutes to impact mobility of tissue prior to manual therapy    Time Entry(in minutes):  Hot/Cold Pack Time Entry: 10 (neck and upper back)  Manual Therapy Time Entry: 15  Therapeutic Exercise Time Entry: 30    Assessment & Plan   Assessment  Patient with good response to therapy intervention from the  beginning. Patient with improvements in cervical range of motion and upper extremity strengthening. Patient with improvement in overall quality of motion upper extremities. Patient with improvement in postural alignment with fewer cues needed. Patient with improvement in functional activities tolerance as evidenced by improvement in FOTO score. Physical therapist feels patient could continue to benefit from physical therapist intervention 2x week x4 weeks to impact achievement of all goals. Patient will be having procedures and physical therapist will make adjustments to POC as appropriate.      Plan  From a physical therapy perspective, the patient would benefit from: Skilled Rehab Services                              Plan details: Planned therapy interventions include: Therapeutic exercise, Therapeutic activities, Neuromuscular re-education, and Manual therapy. Planned modalities to include: Cryotherapy (cold pack), Electrical stimulation - passive/unattended, Mechanical traction, Thermotherapy (hot pack), Ultrasound, and Other (Comment). Dry needling, MFD Visit Frequency: 2 times Per Week for 4 Weeks. 7/24/2025-8/22/2025          The patient will continue to benefit from skilled outpatient physical therapy in order to address the deficits listed in the problem list on the initial evaluation, provide patient and family education, and maximize the patients level of independence in the home and community environments.     The patient's spiritual, cultural, and educational needs were considered, and the patient is agreeable to the plan of care and goals.           Goals:   Active       Physical Therapy       STG 3 weeks:     1. Patient demonstrates independence with home exercise program and progression. Met CB 7/24/2025  2. Patient demonstrates postural awareness with all functional activities. Met CB 7/24/2025    3. Patient demonstrates decreased complaints of pain symptoms to 5/10 at worse. Progress 95%CB  7/24/2025    4. Patient reports decreased dropping of objects with functional activities. Progress  7/24/2025      LTG 6 weeks:     1. Patient demonstrates improvement in overall functional mobility evidenced by improvement in FOTO score 80 or greater. Progress CB 7/24/2025    2. Patient demonstrates improvement in upper extremity strength by at least 1/2 grade or greater. Progress CB 7/24/2025    3. Patient demonstrates improvement in cervical range of motion to WNL to impact functional mobility. Progress CB 7/24/2025    4. Patient demonstrates ability to carry 10# to simulate carrying household items. Progress CB 7/24/2025    5. Patient reports decreased radicular pain symptoms with decreased pain 2/10 or less. Progress CB 7/24/2025    6. Patient able to perform Upper extremity range of motion with good quality of movement. Progress  7/24/2025    7. Goals PRN.       Physical Therapy Goal (Progressing)       Start:  06/13/25    Expected End:  08/22/25       STG 3 weeks:     1. Patient demonstrates independence with home exercise program and progression. Met CB 7/24/2025  2. Patient demonstrates postural awareness with all functional activities. Met CB 7/24/2025    3. Patient demonstrates decreased complaints of pain symptoms to 5/10 at worse. Progress 95%CB 7/24/2025    4. Patient reports decreased dropping of objects with functional activities. Progress  7/24/2025      LTG 6 weeks:     1. Patient demonstrates improvement in overall functional mobility evidenced by improvement in FOTO score 80 or greater. Progress CB 7/24/2025    2. Patient demonstrates improvement in upper extremity strength by at least 1/2 grade or greater. Progress CB 7/24/2025    3. Patient demonstrates improvement in cervical range of motion to WNL to impact functional mobility. Progress CB 7/24/2025    4. Patient demonstrates ability to carry 10# to simulate carrying household items. Progress  7/24/2025    5. Patient reports decreased  radicular pain symptoms with decreased pain 2/10 or less. Progress CB 7/24/2025    6. Patient able to perform Upper extremity range of motion with good quality of movement. Progress CB 7/24/2025    7. Goals PRN.              Sheri Cortes, PT, MPT

## 2025-07-24 NOTE — PRE-PROCEDURE INSTRUCTIONS
Patient scheduled with Dr. Horton on 7/25/25 at [facility].  Patient denies any known infection or use of antibiotics in the past 2 weeks.  Patient denies any new prescription of blood thinners not listed on medication list currently.    Patient instructed to arrive at 11:30.  Light breakfast encouraged; morning medications OK to take.  Patient should have transportation home.  Verbalizes understanding.

## 2025-07-25 ENCOUNTER — HOSPITAL ENCOUNTER (OUTPATIENT)
Facility: HOSPITAL | Age: 55
Discharge: HOME | End: 2025-07-25
Attending: ANESTHESIOLOGY | Admitting: ANESTHESIOLOGY
Payer: MEDICAID

## 2025-07-25 ENCOUNTER — HOSPITAL ENCOUNTER (OUTPATIENT)
Dept: RADIOLOGY | Facility: HOSPITAL | Age: 55
Discharge: HOME | End: 2025-07-25
Attending: ANESTHESIOLOGY | Admitting: ANESTHESIOLOGY
Payer: MEDICAID

## 2025-07-25 VITALS
SYSTOLIC BLOOD PRESSURE: 145 MMHG | HEART RATE: 62 BPM | OXYGEN SATURATION: 95 % | TEMPERATURE: 97 F | DIASTOLIC BLOOD PRESSURE: 70 MMHG | RESPIRATION RATE: 14 BRPM

## 2025-07-25 DIAGNOSIS — F41.9 ANXIETY: ICD-10-CM

## 2025-07-25 DIAGNOSIS — M54.2 CHRONIC NECK PAIN: Primary | ICD-10-CM

## 2025-07-25 DIAGNOSIS — M54.12 CERVICAL RADICULOPATHY: ICD-10-CM

## 2025-07-25 DIAGNOSIS — G89.29 CHRONIC NECK PAIN: Primary | ICD-10-CM

## 2025-07-25 DIAGNOSIS — G89.29 CHRONIC PAIN: ICD-10-CM

## 2025-07-25 PROCEDURE — 63600175 PHARM REV CODE 636 W HCPCS: Performed by: ANESTHESIOLOGY

## 2025-07-25 PROCEDURE — 76000 FLUOROSCOPY <1 HR PHYS/QHP: CPT | Mod: TC

## 2025-07-25 PROCEDURE — 25500020 PHARM REV CODE 255: Performed by: ANESTHESIOLOGY

## 2025-07-25 PROCEDURE — 25000003 PHARM REV CODE 250: Performed by: ANESTHESIOLOGY

## 2025-07-25 PROCEDURE — 62321 NJX INTERLAMINAR CRV/THRC: CPT | Mod: ,,, | Performed by: ANESTHESIOLOGY

## 2025-07-25 PROCEDURE — 62321 NJX INTERLAMINAR CRV/THRC: CPT | Performed by: ANESTHESIOLOGY

## 2025-07-25 RX ORDER — DEXAMETHASONE SODIUM PHOSPHATE 10 MG/ML
INJECTION, SOLUTION INTRA-ARTICULAR; INTRALESIONAL; INTRAMUSCULAR; INTRAVENOUS; SOFT TISSUE
Status: DISCONTINUED | OUTPATIENT
Start: 2025-07-25 | End: 2025-07-25 | Stop reason: HOSPADM

## 2025-07-25 RX ORDER — LIDOCAINE HYDROCHLORIDE 10 MG/ML
INJECTION, SOLUTION EPIDURAL; INFILTRATION; INTRACAUDAL; PERINEURAL
Status: DISCONTINUED | OUTPATIENT
Start: 2025-07-25 | End: 2025-07-25 | Stop reason: HOSPADM

## 2025-07-25 RX ORDER — LIDOCAINE HYDROCHLORIDE 20 MG/ML
INJECTION, SOLUTION INFILTRATION; PERINEURAL
Status: DISCONTINUED | OUTPATIENT
Start: 2025-07-25 | End: 2025-07-25 | Stop reason: HOSPADM

## 2025-07-25 RX ORDER — ALPRAZOLAM 0.25 MG/1
0.5 TABLET, ORALLY DISINTEGRATING ORAL
Status: DISCONTINUED | OUTPATIENT
Start: 2025-07-25 | End: 2025-07-25 | Stop reason: HOSPADM

## 2025-07-25 RX ADMIN — ALPRAZOLAM 0.5 MG: 0.25 TABLET, ORALLY DISINTEGRATING ORAL at 12:07

## 2025-07-25 NOTE — DISCHARGE SUMMARY
Discharge Note  Short Stay    Admit Date: 7/25/2025    Attending Physician: Guillermo Horton    Discharge Physician: Guillermo Horton    Discharge Date: 7/25/2025 1:10 PM    Procedure(s) (LRB):  CERVICAL EPIDURAL STEROID INJECTION (C7-T1) (ORAL) (N/A)    Final Diagnosis: Cervical radiculopathy [M54.12]    Disposition: Home or self care    Patient Instructions:   Current Discharge Medication List        CONTINUE these medications which have NOT CHANGED    Details   atorvastatin (LIPITOR) 20 MG tablet 1 tablet Orally Once a day for 30 days      clotrimazole (LOTRIMIN) 1 % cream Apply to affected area 2 times daily  Qty: 30 g, Refills: 1    Associated Diagnoses: Cutaneous candidiasis      DULoxetine (CYMBALTA) 60 MG capsule Take 60 mg by mouth once daily.      fezolinetant (VEOZAH) 45 mg Tab TAKE 1 TABLET BY MOUTH DAILY  Qty: 30 tablet, Refills: 6      gabapentin (NEURONTIN) 600 MG tablet Take 1 tablet (600 mg total) by mouth 3 (three) times daily.  Qty: 90 tablet, Refills: 11      mirtazapine (REMERON) 45 MG tablet Take 45 mg by mouth nightly.      omeprazole 20 mg TbEC 1 tablet 30 minutes before a meal      !! OXcarbazepine (TRILEPTAL) 150 MG Tab Take 150 mg by mouth every evening.      !! OXcarbazepine (TRILEPTAL) 600 MG Tab Take 600 mg by mouth nightly. mood      QUEtiapine (SEROQUEL) 200 MG Tab Take 200 mg by mouth nightly.      HYDROcodone-acetaminophen (NORCO) 5-325 mg per tablet Take 1 tablet by mouth every 6 (six) hours as needed for Pain.  Qty: 20 tablet, Refills: 0    Comments: Quantity prescribed more than 7 day supply? No  Associated Diagnoses: Spasm of left trapezius muscle; Acute pain of left shoulder; Cervical radicular pain      nystatin (MYCOSTATIN) powder Apply topically 2 (two) times daily.  Qty: 60 g, Refills: 1    Associated Diagnoses: Cutaneous candidiasis       !! - Potential duplicate medications found. Please discuss with provider.          Discharge Diagnosis: Cervical radiculopathy  [M54.12]  Condition on Discharge: Stable with no complications to procedure   Diet on Discharge: Same as before.  Activity: as per instruction sheet.  Discharge to: Home with a responsible adult.  Follow up: 2-4 weeks       Please call my office or pager at [phone] if experienced any weakness or loss of sensation, fever > 101.5, pain uncontrolled with oral medications, persistent nausea/vomiting/or diarrhea, redness or drainage from the incisions, or any other worrisome concerns. If physician on call was not reached or could not communicate with our office for any reason please go to the nearest emergency department.     Guillermo Horton  07/25/2025

## 2025-07-25 NOTE — OP NOTE
Cervical Interlaminar Epidural Steroid Injection under Fluoroscopic Guidance    The procedure, risks, benefits, and options were discussed with the patient. There are no contraindications to the procedure. The patent expressed understanding and agreed to the procedure. Informed written consent was obtained prior to the start of the procedure and can be found in the patient's chart.     PATIENT NAME: Kisha Ladd   MRN: [id number]     DATE OF PROCEDURE: 07/25/2025    PROCEDURE: Cervical Interlaminar Epidural Steroid Injection C7/T1 under Fluoroscopic Guidance    PRE-OP DIAGNOSIS: Cervical radiculopathy [M54.12] Cervical radiculopathy [M54.12]    POST-OP DIAGNOSIS: Same    PHYSICIAN: Guillermo Horton MD       MEDICATIONS INJECTED: Preservative-free Decadron 10mg with 1cc of Lidocaine 1% MPF and preservative free normal saline    LOCAL ANESTHETIC INJECTED: Xylocaine 2%     SEDATION: oral    ESTIMATED BLOOD LOSS: None    COMPLICATIONS: None    TECHNIQUE: Time-out was performed to identify the patient and procedure to be performed. With the patient laying in a prone position, the surgical area was prepped and draped in the usual sterile fashion using ChloraPrep and a fenestrated drape. The level was determined under fluoroscopy guidance. Skin anesthesia was achieved by injecting Lidocaine 2% over the injection site.  The interlaminar space was then approached with a 20 gauge, 3.5 inch Tuohy needle that was introduced under fluoroscopic guidance with AP, lateral and/or contralateral oblique imaging. Once the Ligamentum flavum was encountered loss of resistance to saline was used to enter the epidural space. With positive loss of resistance and negative aspiration for CSF or Blood, contrast dye  Omnipaque (300mg/mL) was injected to confirm placement and there was no vascular runoff. Then 3 mL of the medication mixture listed above was then injected slowly. Displacement of the radio opaque contrast after injection of the  medication confirmed that the medication went into the area of the epidural space. The needles were removed, and bleeding was nil. A sterile dressing was applied. No specimens collected. The patient tolerated the procedure well.     The patient was monitored after the procedure in the recovery area. They were given post-procedure and discharge instructions to follow at home. The patient was discharged in a stable condition.    Guillermo Horton MD

## 2025-07-25 NOTE — DISCHARGE INSTRUCTIONS
DIET: You may resume your normal diet today.    BATHING: You may resume your normal bathing.          You may shower, no hot water directly on site for 24 hours.    DRESSING: You may remove your bandage today.    ACTIVITY LEVEL: You may resume your normal activities 24 hours after your  procedure.    If you have received sedation or an anesthetic, you may feel sleepy for several hours. Rest until you are more awake. Gradually resume your normal activities tomorrow.    If you have received sedation or an anesthetic, do not drive or operate heavy machinery for at least 24 hours.    MEDICATION: You may resume your normal medications today.    You will receive instructions for any pain prescriptions. Pain medications should be taken only as directed.    SPECIAL INSTRUCTIONS: No heat to the injection site for 24 hours including: bath or shower, heating pad, moist heat, hot tubs.    Use ice pack to injection site for any pain or discomfort. Apply ice pack to 20 minutes then remove for 20 minutes before re-applying to site.    WHEN TO CALL DOCTOR: Redness or swelling around injection site    Fever of 101F    Drainage (pus) from the injection site    For any continuous bleeding (some dried blood over the incision is normal).    FOLLOW UP: Follow up phone call will be made by office.    FOR EMERGENCIES: If any unusual problems or difficulties occur during clinic hours, call [phone] or [phone]: You may resume your normal diet today.    BATHING: You may resume your normal bathing.          You may shower, no hot water directly on site for 24 hours.    DRESSING: You may remove your bandage today.    ACTIVITY LEVEL: You may resume your normal activities 24 hours after your  procedure.    If you have received sedation or an anesthetic, you may feel sleepy for several hours. Rest until you are more awake. Gradually resume your normal activities tomorrow.    If you have received sedation or an anesthetic, do not drive or  operate heavy machinery for at least 24 hours.    MEDICATION: You may resume your normal medications today.    You will receive instructions for any pain prescriptions. Pain medications should be taken only as directed.    SPECIAL INSTRUCTIONS: No heat to the injection site for 24 hours including: bath or shower, heating pad, moist heat, hot tubs.    Use ice pack to injection site for any pain or discomfort. Apply ice pack to 20 minutes then remove for 20 minutes before re-applying to site.    WHEN TO CALL DOCTOR: Redness or swelling around injection site    Fever of 101F    Drainage (pus) from the injection site    For any continuous bleeding (some dried blood over the incision is normal).    FOLLOW UP: Follow up phone call will be made by office.    FOR EMERGENCIES: If any unusual problems or difficulties occur during clinic hours, call [phone] or [phone].

## 2025-07-29 ENCOUNTER — CLINICAL SUPPORT (OUTPATIENT)
Facility: HOSPITAL | Age: 55
End: 2025-07-29
Payer: MEDICAID

## 2025-07-29 DIAGNOSIS — M54.2 CHRONIC NECK PAIN: Primary | ICD-10-CM

## 2025-07-29 DIAGNOSIS — G89.29 CHRONIC NECK PAIN: Primary | ICD-10-CM

## 2025-07-29 DIAGNOSIS — M62.9 MUSCULOSKELETAL DISORDER INVOLVING UPPER TRAPEZIUS MUSCLE: ICD-10-CM

## 2025-07-29 PROCEDURE — 97110 THERAPEUTIC EXERCISES: CPT | Mod: CQ

## 2025-07-29 NOTE — PROGRESS NOTES
Outpatient Rehab    Physical Therapy Visit    Patient Name: Kisha Ladd  MRN: [id number]  YOB: 1970  Encounter Date: 7/29/2025    Therapy Diagnosis:   Encounter Diagnoses   Name Primary?    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      Physician: Guillermo Horton MD    Physician Orders: Eval and Treat  Medical Diagnosis: Musculoskeletal disorder involving upper trapezius muscle  Chronic neck pain  Arthropathy of cervical facet joint  Degeneration of intervertebral disc of cervical region with osteophyte of cervical vertebra  Surgical Diagnosis: Not applicable for this Episode   Surgical Date: Not applicable for this Episode  Days Since Last Surgery: Not applicable for this Episode    Visit # / Visits Authorized:  10 / 16  Insurance Authorization Period: 6/13/2025 to 8/22/2025  Date of Evaluation: 6/13/2025  Plan of Care Certification: 7/24/2025 to 8/22/2025      PT/PTA: PTA   Number of PTA visits since last PT visit:1  Time In: 0800   Time Out: 0853  Total Time (in minutes): 53   Total Billable Time (in minutes):      FOTO:  Intake Score (%): 17  Survey Score 2 (%): 46  Survey Score 3 (%): Not applicable for this Episode    Precautions:         Subjective   Patient states the neck injections were very painful and she is still hurting this am..  Pain reported as 10/10.      Objective            Treatment:  Therapeutic Exercise  TE 1: Cervical ROM seated rotation 3x 12  TE 2: Cervical ROM flexion/extension 3x12  Manual Therapy  MT 2: STM to neck and upper trap/levator with manual stretching also to these structures.  Modalities  Moist Heat (min): MHP x10 minutes to impact mobility of tissue prior to manual therapy    Time Entry(in minutes):  Manual Therapy Time Entry: 18  Therapeutic Exercise Time Entry: 20    Assessment & Plan   Assessment: Patient with good tolerance with ROM therex to improve neck mobility. Patient with limited ability to  complete therex due to cervical pain.   Evaluation/Treatment Tolerance: Patient limited by pain    The patient will continue to benefit from skilled outpatient physical therapy in order to address the deficits listed in the problem list on the initial evaluation, provide patient and family education, and maximize the patients level of independence in the home and community environments.     The patient's spiritual, cultural, and educational needs were considered, and the patient is agreeable to the plan of care and goals.     Education  Education was done with Patient. The patient's learning style includes Demonstration and Listening. The patient Demonstrates understanding and Verbalizes understanding.                 Plan: Planned therapy interventions include: Therapeutic exercise, Therapeutic activities, Neuromuscular re-education, and Manual therapy.    Planned modalities to include: Cryotherapy (cold pack), Electrical stimulation - passive/unattended, Mechanical traction, Thermotherapy (hot pack), Ultrasound, and Other (Comment). Dry needling, MFD  Visit Frequency: 2 times Per Week for 4 Weeks. 7/24/2025-8/22/2025    Goals:   Active       Physical Therapy       STG 3 weeks:     1. Patient demonstrates independence with home exercise program and progression. Met CB 7/24/2025  2. Patient demonstrates postural awareness with all functional activities. Met CB 7/24/2025    3. Patient demonstrates decreased complaints of pain symptoms to 5/10 at worse. Progress 95%CB 7/24/2025    4. Patient reports decreased dropping of objects with functional activities. Progress CB 7/24/2025      LTG 6 weeks:     1. Patient demonstrates improvement in overall functional mobility evidenced by improvement in FOTO score 80 or greater. Progress CB 7/24/2025    2. Patient demonstrates improvement in upper extremity strength by at least 1/2 grade or greater. Progress CB 7/24/2025    3. Patient demonstrates improvement in cervical range  of motion to WNL to impact functional mobility. Progress  7/24/2025    4. Patient demonstrates ability to carry 10# to simulate carrying household items. Progress  7/24/2025    5. Patient reports decreased radicular pain symptoms with decreased pain 2/10 or less. Progress  7/24/2025    6. Patient able to perform Upper extremity range of motion with good quality of movement. Progress  7/24/2025    7. Goals PRN.       Physical Therapy Goal (Progressing)       Start:  06/13/25    Expected End:  08/22/25       STG 3 weeks:     1. Patient demonstrates independence with home exercise program and progression. Met  7/24/2025  2. Patient demonstrates postural awareness with all functional activities. Met  7/24/2025    3. Patient demonstrates decreased complaints of pain symptoms to 5/10 at worse. Progress 95% 7/24/2025    4. Patient reports decreased dropping of objects with functional activities. Progress  7/24/2025      LTG 6 weeks:     1. Patient demonstrates improvement in overall functional mobility evidenced by improvement in FOTO score 80 or greater. Progress  7/24/2025    2. Patient demonstrates improvement in upper extremity strength by at least 1/2 grade or greater. Progress  7/24/2025    3. Patient demonstrates improvement in cervical range of motion to WNL to impact functional mobility. Progress  7/24/2025    4. Patient demonstrates ability to carry 10# to simulate carrying household items. Progress  7/24/2025    5. Patient reports decreased radicular pain symptoms with decreased pain 2/10 or less. Progress  7/24/2025    6. Patient able to perform Upper extremity range of motion with good quality of movement. Progress  7/24/2025    7. Goals PRN.              Yokasta Wilson, PTA

## 2025-08-05 ENCOUNTER — CLINICAL SUPPORT (OUTPATIENT)
Facility: HOSPITAL | Age: 55
End: 2025-08-05
Payer: MEDICAID

## 2025-08-05 DIAGNOSIS — G89.29 CHRONIC NECK PAIN: Primary | ICD-10-CM

## 2025-08-05 DIAGNOSIS — M62.9 MUSCULOSKELETAL DISORDER INVOLVING UPPER TRAPEZIUS MUSCLE: ICD-10-CM

## 2025-08-05 DIAGNOSIS — M54.2 CHRONIC NECK PAIN: Primary | ICD-10-CM

## 2025-08-05 PROCEDURE — 97110 THERAPEUTIC EXERCISES: CPT | Mod: CQ

## 2025-08-05 NOTE — PROGRESS NOTES
Outpatient Rehab    Physical Therapy Visit    Patient Name: Kisha Ladd  MRN: [id number]  YOB: 1970  Encounter Date: 8/5/2025    Therapy Diagnosis:   Encounter Diagnoses   Name Primary?    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      Physician: Guillermo Horton MD    Physician Orders: Eval and Treat  Medical Diagnosis: Musculoskeletal disorder involving upper trapezius muscle  Chronic neck pain  Arthropathy of cervical facet joint  Degeneration of intervertebral disc of cervical region with osteophyte of cervical vertebra  Surgical Diagnosis: Not applicable for this Episode   Surgical Date: Not applicable for this Episode  Days Since Last Surgery: Not applicable for this Episode    Visit # / Visits Authorized:  11 / 16  Insurance Authorization Period: 6/13/2025 to 8/22/2025  Date of Evaluation: 6/13/2025  Plan of Care Certification: 7/24/2025 to 8/22/2025      PT/PTA: PTA   Number of PTA visits since last PT visit:2  Time In: 0800   Time Out: 0858  Total Time (in minutes): 58   Total Billable Time (in minutes):      FOTO:  Intake Score (%): 17  Survey Score 2 (%): 46  Survey Score 3 (%): Not applicable for this Episode    Precautions:         Subjective             Objective            Treatment:  Therapeutic Exercise  TE 1: Cervical ROM seated rotation 3x12  TE 2: Cervical ROM flexion/extension 3x12  TE 3: Shrugs 3x12  TE 4: Rows 10# 3x10  TE 5: SAPD 10# 3x10  TE 6: Arm bike 4 minutes forward/ 4 minutes backwards  Manual Therapy  MT 2: STM to neck and upper trap/levator  Therapeutic Activity  TA 1: Standing wall slides 2x10  TA 2: Standing T rotations 2x10  TA 3: Ball flexion on wall 3x10    Time Entry(in minutes):  Manual Therapy Time Entry: 15  Therapeutic Activity Time Entry: 15  Therapeutic Exercise Time Entry: 28    Assessment & Plan   Assessment: Patient with great tolerance with added therapeutic activities to improve thoracic and  cervical mobility with good form and decreased tightness noted.   Evaluation/Treatment Tolerance: Patient tolerated treatment well    The patient will continue to benefit from skilled outpatient physical therapy in order to address the deficits listed in the problem list on the initial evaluation, provide patient and family education, and maximize the patients level of independence in the home and community environments.     The patient's spiritual, cultural, and educational needs were considered, and the patient is agreeable to the plan of care and goals.     Education  Education was done with Patient. The patient's learning style includes Demonstration and Listening. The patient Demonstrates understanding and Verbalizes understanding.                 Plan: Planned therapy interventions include: Therapeutic exercise, Therapeutic activities, Neuromuscular re-education, and Manual therapy.    Planned modalities to include: Cryotherapy (cold pack), Electrical stimulation - passive/unattended, Mechanical traction, Thermotherapy (hot pack), Ultrasound, and Other (Comment). Dry needling, MFD  Visit Frequency: 2 times Per Week for 4 Weeks. 7/24/2025-8/22/2025    Goals:   Active       Physical Therapy       STG 3 weeks:     1. Patient demonstrates independence with home exercise program and progression. Met CB 7/24/2025  2. Patient demonstrates postural awareness with all functional activities. Met CB 7/24/2025    3. Patient demonstrates decreased complaints of pain symptoms to 5/10 at worse. Progress 95%CB 7/24/2025    4. Patient reports decreased dropping of objects with functional activities. Progress CB 7/24/2025      LTG 6 weeks:     1. Patient demonstrates improvement in overall functional mobility evidenced by improvement in FOTO score 80 or greater. Progress CB 7/24/2025    2. Patient demonstrates improvement in upper extremity strength by at least 1/2 grade or greater. Progress CB 7/24/2025    3. Patient  demonstrates improvement in cervical range of motion to WNL to impact functional mobility. Progress  7/24/2025    4. Patient demonstrates ability to carry 10# to simulate carrying household items. Progress  7/24/2025    5. Patient reports decreased radicular pain symptoms with decreased pain 2/10 or less. Progress  7/24/2025    6. Patient able to perform Upper extremity range of motion with good quality of movement. Progress  7/24/2025    7. Goals PRN.       Physical Therapy Goal (Progressing)       Start:  06/13/25    Expected End:  08/22/25       STG 3 weeks:     1. Patient demonstrates independence with home exercise program and progression. Met  7/24/2025  2. Patient demonstrates postural awareness with all functional activities. Met  7/24/2025    3. Patient demonstrates decreased complaints of pain symptoms to 5/10 at worse. Progress 95%CB 7/24/2025    4. Patient reports decreased dropping of objects with functional activities. Progress  7/24/2025      LTG 6 weeks:     1. Patient demonstrates improvement in overall functional mobility evidenced by improvement in FOTO score 80 or greater. Progress  7/24/2025    2. Patient demonstrates improvement in upper extremity strength by at least 1/2 grade or greater. Progress  7/24/2025    3. Patient demonstrates improvement in cervical range of motion to WNL to impact functional mobility. Progress  7/24/2025    4. Patient demonstrates ability to carry 10# to simulate carrying household items. Progress  7/24/2025    5. Patient reports decreased radicular pain symptoms with decreased pain 2/10 or less. Progress  7/24/2025    6. Patient able to perform Upper extremity range of motion with good quality of movement. Progress  7/24/2025    7. Goals PRN.              Yokasta Wilson, PTA

## 2025-08-07 ENCOUNTER — CLINICAL SUPPORT (OUTPATIENT)
Facility: HOSPITAL | Age: 55
End: 2025-08-07
Payer: MEDICAID

## 2025-08-07 DIAGNOSIS — R53.81 DEBILITY: ICD-10-CM

## 2025-08-07 DIAGNOSIS — G89.29 CHRONIC NECK PAIN: Primary | ICD-10-CM

## 2025-08-07 DIAGNOSIS — M54.2 CHRONIC NECK PAIN: Primary | ICD-10-CM

## 2025-08-07 PROCEDURE — 97110 THERAPEUTIC EXERCISES: CPT | Mod: CQ

## 2025-08-07 NOTE — PROGRESS NOTES
Outpatient Rehab    Physical Therapy Visit    Patient Name: Kisha Ladd  MRN: [id number]  YOB: 1970  Encounter Date: 8/7/2025    Therapy Diagnosis:   Encounter Diagnoses   Name Primary?    Chronic neck pain Yes    Debility      Physician: Guillermo Horton MD    Physician Orders: Eval and Treat  Medical Diagnosis: Musculoskeletal disorder involving upper trapezius muscle  Chronic neck pain  Arthropathy of cervical facet joint  Degeneration of intervertebral disc of cervical region with osteophyte of cervical vertebra  Surgical Diagnosis: Not applicable for this Episode   Surgical Date: Not applicable for this Episode  Days Since Last Surgery: Not applicable for this Episode    Visit # / Visits Authorized:  12 / 16  Insurance Authorization Period: 6/13/2025 to 8/22/2025  Date of Evaluation: 6/13/2025  Plan of Care Certification: 7/24/2025 to 8/22/2025      PT/PTA: PTA   Number of PTA visits since last PT visit:3  Time In: 0802   Time Out: 0902  Total Time (in minutes): 60   Total Billable Time (in minutes):      FOTO:  Intake Score (%): 17  Survey Score 2 (%): 46  Survey Score 3 (%): Not applicable for this Episode    Precautions:         Subjective   Patient states she is feeling better and she liked the new stretches..  Pain reported as 5/10.      Objective            Treatment:  Therapeutic Exercise  TE 1: Cervical ROM seated rotation 3x12  TE 2: Cervical ROM flexion/extension 3x12  TE 3: Shrugs 3x12  TE 4: Rows 10# 3x12  TE 5: SAPD 10# 3x12  TE 6: Arm bike 4 minutes forward/ 4 minutes backwards  Manual Therapy  MT 2: STM to neck and upper trap/levator  Therapeutic Activity  TA 1: Standing wall slides 2x10  TA 2: Standing T rotations 2x10    Time Entry(in minutes):  Manual Therapy Time Entry: 15  Therapeutic Activity Time Entry: 15  Therapeutic Exercise Time Entry: 30    Assessment & Plan   Assessment: Patient with good tolerance with increased reps with standing SAPD and Rows to improve  posterior shoulder muscle strength and endurance.   Evaluation/Treatment Tolerance: Patient tolerated treatment well    The patient will continue to benefit from skilled outpatient physical therapy in order to address the deficits listed in the problem list on the initial evaluation, provide patient and family education, and maximize the patients level of independence in the home and community environments.     The patient's spiritual, cultural, and educational needs were considered, and the patient is agreeable to the plan of care and goals.     Education  Education was done with Patient. The patient's learning style includes Demonstration and Listening. The patient Demonstrates understanding and Verbalizes understanding.                 Plan: Planned therapy interventions include: Therapeutic exercise, Therapeutic activities, Neuromuscular re-education, and Manual therapy.    Planned modalities to include: Cryotherapy (cold pack), Electrical stimulation - passive/unattended, Mechanical traction, Thermotherapy (hot pack), Ultrasound, and Other (Comment). Dry needling, MFD  Visit Frequency: 2 times Per Week for 4 Weeks. 7/24/2025-8/22/2025    Goals:   Active       Physical Therapy       STG 3 weeks:     1. Patient demonstrates independence with home exercise program and progression. Met CB 7/24/2025  2. Patient demonstrates postural awareness with all functional activities. Met CB 7/24/2025    3. Patient demonstrates decreased complaints of pain symptoms to 5/10 at worse. Progress 95%CB 7/24/2025    4. Patient reports decreased dropping of objects with functional activities. Progress CB 7/24/2025      LTG 6 weeks:     1. Patient demonstrates improvement in overall functional mobility evidenced by improvement in FOTO score 80 or greater. Progress CB 7/24/2025    2. Patient demonstrates improvement in upper extremity strength by at least 1/2 grade or greater. Progress CB 7/24/2025    3. Patient demonstrates  improvement in cervical range of motion to WNL to impact functional mobility. Progress  7/24/2025    4. Patient demonstrates ability to carry 10# to simulate carrying household items. Progress  7/24/2025    5. Patient reports decreased radicular pain symptoms with decreased pain 2/10 or less. Progress  7/24/2025    6. Patient able to perform Upper extremity range of motion with good quality of movement. Progress  7/24/2025    7. Goals PRN.       Physical Therapy Goal (Progressing)       Start:  06/13/25    Expected End:  08/22/25       STG 3 weeks:     1. Patient demonstrates independence with home exercise program and progression. Met  7/24/2025  2. Patient demonstrates postural awareness with all functional activities. Met  7/24/2025    3. Patient demonstrates decreased complaints of pain symptoms to 5/10 at worse. Progress 95%CB 7/24/2025    4. Patient reports decreased dropping of objects with functional activities. Progress  7/24/2025      LTG 6 weeks:     1. Patient demonstrates improvement in overall functional mobility evidenced by improvement in FOTO score 80 or greater. Progress  7/24/2025    2. Patient demonstrates improvement in upper extremity strength by at least 1/2 grade or greater. Progress  7/24/2025    3. Patient demonstrates improvement in cervical range of motion to WNL to impact functional mobility. Progress  7/24/2025    4. Patient demonstrates ability to carry 10# to simulate carrying household items. Progress  7/24/2025    5. Patient reports decreased radicular pain symptoms with decreased pain 2/10 or less. Progress  7/24/2025    6. Patient able to perform Upper extremity range of motion with good quality of movement. Progress  7/24/2025    7. Goals PRN.              Yokasta Wilson, PTA

## 2025-08-20 ENCOUNTER — TELEPHONE (OUTPATIENT)
Facility: HOSPITAL | Age: 55
End: 2025-08-20
Payer: MEDICAID

## (undated) DEVICE — CHLORAPREP 10.5 ML APPLICATOR

## (undated) DEVICE — GOWN NONREINF SET-IN SLV XL

## (undated) DEVICE — KIT BIOGUARD AIR WTR SUC VALVE

## (undated) DEVICE — SPONGE DRY VIA GREEN

## (undated) DEVICE — CANNULA SSOFT C02 MALE 14FT

## (undated) DEVICE — ELECTRODE REM PLYHSV RETURN 9

## (undated) DEVICE — LINER SUCTION CANNISTER REGUGA

## (undated) DEVICE — MARKER SKIN RULER AND LABEL

## (undated) DEVICE — TRAP ETRAP POLYP 50 TRAY

## (undated) DEVICE — SOL IRRI STRL WATER 1000ML

## (undated) DEVICE — UNDERPAD DELUXE FLUFF 30X30IN

## (undated) DEVICE — KIT NERVE BLOCK PREP BAPTIST

## (undated) DEVICE — KIT VIA CUSTOM PROCEDURE

## (undated) DEVICE — TUBING IRRIGATION W/ AIR

## (undated) DEVICE — ELECTRODE MEDI-TRACE 855 FOAM

## (undated) DEVICE — SNARE SNAREMASTER OVAL 25MM

## (undated) DEVICE — CONNECTOR WATERJET ENDO